# Patient Record
Sex: FEMALE | Race: WHITE | NOT HISPANIC OR LATINO | Employment: OTHER | URBAN - METROPOLITAN AREA
[De-identification: names, ages, dates, MRNs, and addresses within clinical notes are randomized per-mention and may not be internally consistent; named-entity substitution may affect disease eponyms.]

---

## 2019-01-15 ENCOUNTER — AMBULATORY - HEALTHEAST (OUTPATIENT)
Dept: MEDSURG UNIT | Facility: CLINIC | Age: 74
End: 2019-01-15

## 2019-01-21 ENCOUNTER — OFFICE VISIT - HEALTHEAST (OUTPATIENT)
Dept: FAMILY MEDICINE | Facility: CLINIC | Age: 74
End: 2019-01-21

## 2019-01-21 DIAGNOSIS — I48.91 ATRIAL FIBRILLATION WITH RVR (H): ICD-10-CM

## 2019-01-21 DIAGNOSIS — F41.9 ANXIETY: ICD-10-CM

## 2019-01-21 DIAGNOSIS — R09.02 HYPOXIA: ICD-10-CM

## 2019-01-21 ASSESSMENT — MIFFLIN-ST. JEOR: SCORE: 1102.2

## 2019-01-22 ENCOUNTER — COMMUNICATION - HEALTHEAST (OUTPATIENT)
Dept: FAMILY MEDICINE | Facility: CLINIC | Age: 74
End: 2019-01-22

## 2019-02-19 ENCOUNTER — OFFICE VISIT - HEALTHEAST (OUTPATIENT)
Dept: CARDIOLOGY | Facility: CLINIC | Age: 74
End: 2019-02-19

## 2019-02-19 DIAGNOSIS — I48.0 PAROXYSMAL ATRIAL FIBRILLATION (H): ICD-10-CM

## 2019-02-19 ASSESSMENT — MIFFLIN-ST. JEOR: SCORE: 1106.74

## 2019-03-15 ENCOUNTER — COMMUNICATION - HEALTHEAST (OUTPATIENT)
Dept: FAMILY MEDICINE | Facility: CLINIC | Age: 74
End: 2019-03-15

## 2019-03-15 DIAGNOSIS — R09.02 HYPOXIA: ICD-10-CM

## 2019-03-28 ENCOUNTER — COMMUNICATION - HEALTHEAST (OUTPATIENT)
Dept: FAMILY MEDICINE | Facility: CLINIC | Age: 74
End: 2019-03-28

## 2019-04-08 ENCOUNTER — OFFICE VISIT - HEALTHEAST (OUTPATIENT)
Dept: FAMILY MEDICINE | Facility: CLINIC | Age: 74
End: 2019-04-08

## 2019-04-08 ENCOUNTER — COMMUNICATION - HEALTHEAST (OUTPATIENT)
Dept: FAMILY MEDICINE | Facility: CLINIC | Age: 74
End: 2019-04-08

## 2019-04-08 DIAGNOSIS — I48.91 ATRIAL FIBRILLATION WITH RVR (H): ICD-10-CM

## 2019-04-08 LAB
ANION GAP SERPL CALCULATED.3IONS-SCNC: 6 MMOL/L (ref 5–18)
BUN SERPL-MCNC: 11 MG/DL (ref 8–28)
CALCIUM SERPL-MCNC: 9.5 MG/DL (ref 8.5–10.5)
CHLORIDE BLD-SCNC: 105 MMOL/L (ref 98–107)
CO2 SERPL-SCNC: 31 MMOL/L (ref 22–31)
CREAT SERPL-MCNC: 0.81 MG/DL (ref 0.6–1.1)
ERYTHROCYTE [DISTWIDTH] IN BLOOD BY AUTOMATED COUNT: 11.9 % (ref 11–14.5)
GFR SERPL CREATININE-BSD FRML MDRD: >60 ML/MIN/1.73M2
GLUCOSE BLD-MCNC: 107 MG/DL (ref 70–125)
HCT VFR BLD AUTO: 40.5 % (ref 35–47)
HGB BLD-MCNC: 13.4 G/DL (ref 12–16)
MCH RBC QN AUTO: 29 PG (ref 27–34)
MCHC RBC AUTO-ENTMCNC: 33 G/DL (ref 32–36)
MCV RBC AUTO: 88 FL (ref 80–100)
PLATELET # BLD AUTO: 294 THOU/UL (ref 140–440)
PMV BLD AUTO: 8.3 FL (ref 7–10)
POTASSIUM BLD-SCNC: 4.8 MMOL/L (ref 3.5–5)
RBC # BLD AUTO: 4.61 MILL/UL (ref 3.8–5.4)
SODIUM SERPL-SCNC: 142 MMOL/L (ref 136–145)
WBC: 8.4 THOU/UL (ref 4–11)

## 2019-04-11 ENCOUNTER — OFFICE VISIT - HEALTHEAST (OUTPATIENT)
Dept: CARDIOLOGY | Facility: CLINIC | Age: 74
End: 2019-04-11

## 2019-04-11 DIAGNOSIS — I48.0 PAROXYSMAL ATRIAL FIBRILLATION (H): ICD-10-CM

## 2019-04-11 ASSESSMENT — MIFFLIN-ST. JEOR: SCORE: 1115.81

## 2019-06-04 ENCOUNTER — COMMUNICATION - HEALTHEAST (OUTPATIENT)
Dept: FAMILY MEDICINE | Facility: CLINIC | Age: 74
End: 2019-06-04

## 2019-06-04 DIAGNOSIS — R09.02 HYPOXIA: ICD-10-CM

## 2019-06-26 ENCOUNTER — OFFICE VISIT - HEALTHEAST (OUTPATIENT)
Dept: FAMILY MEDICINE | Facility: CLINIC | Age: 74
End: 2019-06-26

## 2019-06-26 ENCOUNTER — COMMUNICATION - HEALTHEAST (OUTPATIENT)
Dept: FAMILY MEDICINE | Facility: CLINIC | Age: 74
End: 2019-06-26

## 2019-06-26 DIAGNOSIS — J18.9 COMMUNITY ACQUIRED PNEUMONIA, UNSPECIFIED LATERALITY: ICD-10-CM

## 2019-06-26 DIAGNOSIS — Z12.11 SCREEN FOR COLON CANCER: ICD-10-CM

## 2019-06-26 DIAGNOSIS — J45.31 MILD PERSISTENT ASTHMA WITH EXACERBATION: ICD-10-CM

## 2019-07-02 ENCOUNTER — OFFICE VISIT - HEALTHEAST (OUTPATIENT)
Dept: FAMILY MEDICINE | Facility: CLINIC | Age: 74
End: 2019-07-02

## 2019-07-02 DIAGNOSIS — J44.1 COPD EXACERBATION (H): ICD-10-CM

## 2019-08-05 ENCOUNTER — COMMUNICATION - HEALTHEAST (OUTPATIENT)
Dept: FAMILY MEDICINE | Facility: CLINIC | Age: 74
End: 2019-08-05

## 2019-08-05 DIAGNOSIS — R09.02 HYPOXIA: ICD-10-CM

## 2019-08-05 DIAGNOSIS — I48.91 ATRIAL FIBRILLATION WITH RVR (H): ICD-10-CM

## 2020-02-28 ENCOUNTER — COMMUNICATION - HEALTHEAST (OUTPATIENT)
Dept: FAMILY MEDICINE | Facility: CLINIC | Age: 75
End: 2020-02-28

## 2020-02-28 DIAGNOSIS — R09.02 HYPOXIA: ICD-10-CM

## 2020-03-02 ENCOUNTER — OFFICE VISIT - HEALTHEAST (OUTPATIENT)
Dept: FAMILY MEDICINE | Facility: CLINIC | Age: 75
End: 2020-03-02

## 2020-03-02 ENCOUNTER — COMMUNICATION - HEALTHEAST (OUTPATIENT)
Dept: FAMILY MEDICINE | Facility: CLINIC | Age: 75
End: 2020-03-02

## 2020-03-02 DIAGNOSIS — R09.02 HYPOXIA: ICD-10-CM

## 2020-03-02 DIAGNOSIS — J44.1 COPD EXACERBATION (H): ICD-10-CM

## 2020-03-02 DIAGNOSIS — I48.91 ATRIAL FIBRILLATION WITH RVR (H): ICD-10-CM

## 2020-04-21 ENCOUNTER — COMMUNICATION - HEALTHEAST (OUTPATIENT)
Dept: FAMILY MEDICINE | Facility: CLINIC | Age: 75
End: 2020-04-21

## 2020-04-21 DIAGNOSIS — R09.02 HYPOXIA: ICD-10-CM

## 2020-06-23 ENCOUNTER — COMMUNICATION - HEALTHEAST (OUTPATIENT)
Dept: CARDIOLOGY | Facility: CLINIC | Age: 75
End: 2020-06-23

## 2020-06-30 ENCOUNTER — COMMUNICATION - HEALTHEAST (OUTPATIENT)
Dept: CARDIOLOGY | Facility: CLINIC | Age: 75
End: 2020-06-30

## 2020-06-30 DIAGNOSIS — I48.0 PAROXYSMAL ATRIAL FIBRILLATION (H): ICD-10-CM

## 2020-07-01 ENCOUNTER — COMMUNICATION - HEALTHEAST (OUTPATIENT)
Dept: CARDIOLOGY | Facility: CLINIC | Age: 75
End: 2020-07-01

## 2020-07-01 DIAGNOSIS — I48.0 PAROXYSMAL ATRIAL FIBRILLATION (H): ICD-10-CM

## 2020-07-14 ENCOUNTER — COMMUNICATION - HEALTHEAST (OUTPATIENT)
Dept: FAMILY MEDICINE | Facility: CLINIC | Age: 75
End: 2020-07-14

## 2020-07-14 DIAGNOSIS — R09.02 HYPOXIA: ICD-10-CM

## 2020-07-15 RX ORDER — BUDESONIDE 0.5 MG/2ML
INHALANT ORAL
Qty: 360 ML | Refills: 0 | Status: SHIPPED | OUTPATIENT
Start: 2020-07-15 | End: 2022-09-20

## 2020-07-29 ENCOUNTER — COMMUNICATION - HEALTHEAST (OUTPATIENT)
Dept: FAMILY MEDICINE | Facility: CLINIC | Age: 75
End: 2020-07-29

## 2020-07-29 DIAGNOSIS — I48.91 ATRIAL FIBRILLATION WITH RVR (H): ICD-10-CM

## 2020-07-29 DIAGNOSIS — R09.02 HYPOXIA: ICD-10-CM

## 2020-07-31 ENCOUNTER — COMMUNICATION - HEALTHEAST (OUTPATIENT)
Dept: CARDIOLOGY | Facility: CLINIC | Age: 75
End: 2020-07-31

## 2020-08-03 ENCOUNTER — OFFICE VISIT - HEALTHEAST (OUTPATIENT)
Dept: CARDIOLOGY | Facility: CLINIC | Age: 75
End: 2020-08-03

## 2020-08-03 DIAGNOSIS — I10 ESSENTIAL HYPERTENSION: ICD-10-CM

## 2020-08-03 DIAGNOSIS — J44.1 COPD EXACERBATION (H): ICD-10-CM

## 2020-08-03 DIAGNOSIS — R09.02 HYPOXIA: ICD-10-CM

## 2020-08-03 DIAGNOSIS — I48.0 PAROXYSMAL ATRIAL FIBRILLATION (H): ICD-10-CM

## 2020-08-03 DIAGNOSIS — I48.91 ATRIAL FIBRILLATION WITH RVR (H): ICD-10-CM

## 2020-08-03 ASSESSMENT — MIFFLIN-ST. JEOR: SCORE: 1133.95

## 2020-08-26 ENCOUNTER — COMMUNICATION - HEALTHEAST (OUTPATIENT)
Dept: FAMILY MEDICINE | Facility: CLINIC | Age: 75
End: 2020-08-26

## 2020-08-26 DIAGNOSIS — R09.02 HYPOXIA: ICD-10-CM

## 2020-08-31 ENCOUNTER — COMMUNICATION - HEALTHEAST (OUTPATIENT)
Dept: CARDIOLOGY | Facility: CLINIC | Age: 75
End: 2020-08-31

## 2020-08-31 DIAGNOSIS — R09.02 HYPOXIA: ICD-10-CM

## 2020-09-01 ENCOUNTER — COMMUNICATION - HEALTHEAST (OUTPATIENT)
Dept: FAMILY MEDICINE | Facility: CLINIC | Age: 75
End: 2020-09-01

## 2020-09-01 DIAGNOSIS — Z00.00 WELLNESS EXAMINATION: ICD-10-CM

## 2020-09-01 RX ORDER — ALBUTEROL SULFATE 90 UG/1
1-2 AEROSOL, METERED RESPIRATORY (INHALATION) EVERY 4 HOURS PRN
Qty: 1 INHALER | Refills: 5 | Status: SHIPPED | OUTPATIENT
Start: 2020-09-01 | End: 2022-01-27

## 2020-11-21 ENCOUNTER — COMMUNICATION - HEALTHEAST (OUTPATIENT)
Dept: CARDIOLOGY | Facility: CLINIC | Age: 75
End: 2020-11-21

## 2020-11-21 DIAGNOSIS — R09.02 HYPOXIA: ICD-10-CM

## 2021-01-04 ENCOUNTER — COMMUNICATION - HEALTHEAST (OUTPATIENT)
Dept: CARDIOLOGY | Facility: CLINIC | Age: 76
End: 2021-01-04

## 2021-01-04 DIAGNOSIS — I48.0 PAROXYSMAL ATRIAL FIBRILLATION (H): ICD-10-CM

## 2021-01-04 RX ORDER — FLECAINIDE ACETATE 100 MG/1
100 TABLET ORAL 2 TIMES DAILY
Qty: 180 TABLET | Refills: 1 | Status: SHIPPED | OUTPATIENT
Start: 2021-01-04 | End: 2022-01-03

## 2021-02-09 ENCOUNTER — COMMUNICATION - HEALTHEAST (OUTPATIENT)
Dept: FAMILY MEDICINE | Facility: CLINIC | Age: 76
End: 2021-02-09

## 2021-04-25 ENCOUNTER — COMMUNICATION - HEALTHEAST (OUTPATIENT)
Dept: FAMILY MEDICINE | Facility: CLINIC | Age: 76
End: 2021-04-25

## 2021-04-25 DIAGNOSIS — R09.02 HYPOXIA: ICD-10-CM

## 2021-04-25 DIAGNOSIS — I48.91 ATRIAL FIBRILLATION WITH RVR (H): ICD-10-CM

## 2021-05-20 ENCOUNTER — RECORDS - HEALTHEAST (OUTPATIENT)
Dept: ADMINISTRATIVE | Facility: OTHER | Age: 76
End: 2021-05-20

## 2021-05-20 DIAGNOSIS — R09.02 HYPOXIA: ICD-10-CM

## 2021-05-20 RX ORDER — RIVAROXABAN 20 MG/1
TABLET, FILM COATED ORAL
Qty: 90 TABLET | Refills: 1 | Status: SHIPPED | OUTPATIENT
Start: 2021-05-20 | End: 2021-11-16

## 2021-05-24 ENCOUNTER — OFFICE VISIT - HEALTHEAST (OUTPATIENT)
Dept: FAMILY MEDICINE | Facility: CLINIC | Age: 76
End: 2021-05-24

## 2021-05-24 DIAGNOSIS — Z00.00 WELLNESS EXAMINATION: ICD-10-CM

## 2021-05-24 DIAGNOSIS — I48.91 ATRIAL FIBRILLATION WITH RVR (H): ICD-10-CM

## 2021-05-24 DIAGNOSIS — I10 ESSENTIAL (PRIMARY) HYPERTENSION: ICD-10-CM

## 2021-05-24 DIAGNOSIS — J45.31 MILD PERSISTENT ASTHMA WITH EXACERBATION: ICD-10-CM

## 2021-05-24 DIAGNOSIS — R09.02 HYPOXIA: ICD-10-CM

## 2021-05-24 LAB
ANION GAP SERPL CALCULATED.3IONS-SCNC: 11 MMOL/L (ref 5–18)
BUN SERPL-MCNC: 14 MG/DL (ref 8–28)
CALCIUM SERPL-MCNC: 8.4 MG/DL (ref 8.5–10.5)
CHLORIDE BLD-SCNC: 107 MMOL/L (ref 98–107)
CHOLEST SERPL-MCNC: 221 MG/DL
CO2 SERPL-SCNC: 27 MMOL/L (ref 22–31)
CREAT SERPL-MCNC: 0.78 MG/DL (ref 0.6–1.1)
ERYTHROCYTE [DISTWIDTH] IN BLOOD BY AUTOMATED COUNT: 12.3 % (ref 11–14.5)
FASTING STATUS PATIENT QL REPORTED: YES
GFR SERPL CREATININE-BSD FRML MDRD: >60 ML/MIN/1.73M2
GLUCOSE BLD-MCNC: 108 MG/DL (ref 70–125)
HCT VFR BLD AUTO: 42 % (ref 35–47)
HDLC SERPL-MCNC: 60 MG/DL
HGB BLD-MCNC: 13.4 G/DL (ref 12–16)
LDLC SERPL CALC-MCNC: 140 MG/DL
MCH RBC QN AUTO: 28.4 PG (ref 27–34)
MCHC RBC AUTO-ENTMCNC: 31.9 G/DL (ref 32–36)
MCV RBC AUTO: 89 FL (ref 80–100)
PLATELET # BLD AUTO: 323 THOU/UL (ref 140–440)
PMV BLD AUTO: 10.2 FL (ref 7–10)
POTASSIUM BLD-SCNC: 3.6 MMOL/L (ref 3.5–5)
RBC # BLD AUTO: 4.72 MILL/UL (ref 3.8–5.4)
SODIUM SERPL-SCNC: 145 MMOL/L (ref 136–145)
TRIGL SERPL-MCNC: 103 MG/DL
WBC: 9.3 THOU/UL (ref 4–11)

## 2021-05-24 RX ORDER — ASCORBIC ACID 500 MG
500 TABLET ORAL DAILY
Status: SHIPPED | COMMUNITY
Start: 2021-05-24

## 2021-05-24 ASSESSMENT — PATIENT HEALTH QUESTIONNAIRE - PHQ9: SUM OF ALL RESPONSES TO PHQ QUESTIONS 1-9: 7

## 2021-05-24 ASSESSMENT — MIFFLIN-ST. JEOR: SCORE: 1133.95

## 2021-05-25 ENCOUNTER — COMMUNICATION - HEALTHEAST (OUTPATIENT)
Dept: FAMILY MEDICINE | Facility: CLINIC | Age: 76
End: 2021-05-25

## 2021-05-27 NOTE — TELEPHONE ENCOUNTER
Medication Question or Clarification  Who is calling: Patient  What medication are you calling about?   albuterol (ACCUNEB) 0.63 mg/3 mL nebulizer solution  1 1/15/2019     Sig: INHALE 1 VIAL VIA NEBULIZER EVERY 4 HOURS AS NEEDED FOR WHEEZING    Who prescribed the medication?: Alessio Fischer MD   What is your question/concern?: Patient stated that she would like to know if she is to continue this medication  Pharmacy: [facility] in [facility] to leave a detailed message?: No (work) [phone] or (home) [phone]. Patient stated that she is at work from 11 am to 7 pm today.   Site [facility] - Please call the pharmacy to obtain any additional needed information.        Medication Question or Clarification  Who is calling: Patient  What medication are you calling about?   budesonide (PULMICORT) 0.5 mg/2 mL nebulizer solution 360 mL 0 3/17/2019 6/15/2019    Sig - Route: Take 2 mL (0.5 mg total) by nebulization 2 (two) times a day. - Nebulization    Who prescribed the medication?: Alessio Fischer MD   What is your question/concern?: Patient stated that she would like to know if she is to continue this medication  Pharmacy: [facility] in [facility] to leave a detailed message?: No (work) [phone] or (home) [phone]. Patient stated that she is at work from 11 am to 7 pm today.   Site [facility] - Please call the pharmacy to obtain any additional needed information.

## 2021-05-27 NOTE — TELEPHONE ENCOUNTER
Patient Returning Call  Reason for call:  Patent calling   Information relayed to patient:  Below message relayed to patient   Patient has additional questions:  NO  If YES, what are your questions/concerns:  NO  Okay to leave a detailed message?:  No call back needed     I'd recommend being seen to see if we can get her off them.  Please have her come in to visit.  She may not need now that her afib is converted.

## 2021-05-27 NOTE — TELEPHONE ENCOUNTER
FYI - Status Update  Who is Calling: Patient  Update: Patient is scheduled for office visit 04/01/19 with Dr. Sandoval  Okay to leave a detailed message?:  No return call needed

## 2021-05-27 NOTE — TELEPHONE ENCOUNTER
No answer.     Left message to call back for: patient  Information to relay to patient:  See below

## 2021-05-27 NOTE — TELEPHONE ENCOUNTER
I'd recommend being seen to see if we can get her off them.  Please have her come in to visit.  She may not need now that her afib is converted.

## 2021-05-27 NOTE — PROGRESS NOTES
Chief Complaint   Patient presents with     Asthma     Follow up        HPI: 74-year-old female presents today for evaluation of her chronic medical conditions including her asthma which has been stable, her COPD has been stable, and she continues to take apixaban for atrial fibrillation.  She notes that her heart has had episodes of running fast.      ROS:She has had no syncope or chest pain.  No edema.    SH:    reports that she quit smoking about 40 years ago. She has never used smokeless tobacco. She reports that she does not drink alcohol or use drugs.      FH: The Patient's family history is not on file.     Meds:  Samra has a current medication list which includes the following prescription(s): albuterol, albuterol, budesonide, cholecalciferol (vitamin d3), guaifenesin er, rivaroxaban, ubidecarenone, albuterol, citalopram, diltiazem, and hydroxyzine pamoate.    O:  /66   Pulse 90   Wt 151 lb (68.5 kg)   SpO2 97%   BMI 28.30 kg/m     Constitutional:    --Vitals as above  --No acute distress  Eyes-  --Sclera noninjected  --Lids and conjunctiva normal  ENT-  --TMs clear  --Sclera noninjected  --Pharynx not erythematous  Neck-  --Neck supple    Lymph-  --No cervical lymphadenopathy  Lungs-  --Clear to Auscultation  Heart-  --Regular rate and rhythm  Skin-  --Pink and dry          A/P:   1. Atrial fibrillation with RVR (H)  The patient has had episodes of tachycardia which may represent breakthrough atrial fibrillation.  We will have her see cardiology soon.  - Ambulatory referral to Cardiology  - Basic Metabolic Panel  - HM2(CBC w/o Differential)    2.  Asthma  - Continue Pulmicort  -Continue albuterol as needed  -Follow-up if not improving    3.  COPD  -Stable    RTC 6 months
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
## 2021-05-28 ASSESSMENT — ASTHMA QUESTIONNAIRES: ACT_TOTALSCORE: 20

## 2021-05-29 NOTE — TELEPHONE ENCOUNTER
Refill Approved    Rx renewed per Medication Renewal Policy. Medication was last renewed on 3/17/19.    Rachel Saavedra, [facility] Triage/Med Refill 6/5/2019     Requested Prescriptions   Pending Prescriptions Disp Refills     budesonide (PULMICORT) 0.5 mg/2 mL nebulizer solution [Pharmacy Med Name: BUDESONIDE 0.5 MG/2 ML SUSP] 360 mL 0     Sig: TAKE 2 ML (0.5 MG TOTAL) BY NEBULIZATION 2 (TWO) TIMES A DAY.       Asthma Medications Refill Protocol Passed - 6/4/2019 10:39 AM        Passed - PCP or prescribing provider visit in last year     Last office visit with prescriber/PCP: 4/8/2019 Johana Sandoval MD OR same dept: 4/8/2019 Johana Sandoval MD OR same specialty: 4/8/2019 Johana Sandoval MD  Last physical: Visit date not found Last MTM visit: Visit date not found    Next appt within 3 mo: Visit date not found Next physical within 3 mo: Visit date not found  Prescriber OR PCP: Johana Sandoval MD  Last diagnosis associated with med order: 1. Hypoxia  - budesonide (PULMICORT) 0.5 mg/2 mL nebulizer solution [Pharmacy Med Name: BUDESONIDE 0.5 MG/2 ML SUSP]; Take 2 mL (0.5 mg total) by nebulization 2 (two) times a day.  Dispense: 360 mL; Refill: 0    If protocol passes may refill for 6 months if within 3 months of last provider visit (or a total of 9 months).

## 2021-05-30 ENCOUNTER — COMMUNICATION - HEALTHEAST (OUTPATIENT)
Dept: FAMILY MEDICINE | Facility: CLINIC | Age: 76
End: 2021-05-30

## 2021-05-30 DIAGNOSIS — R09.02 HYPOXIA: ICD-10-CM

## 2021-05-30 DIAGNOSIS — I48.91 ATRIAL FIBRILLATION WITH RVR (H): ICD-10-CM

## 2021-05-30 NOTE — PROGRESS NOTES
Chief Complaint   Patient presents with     Fever     cough and fever a little over 100       HPI: Very pleasant 74-year-old female who recently returned from a bus trip to [place], presents today with cough, phlegm production, mild fever for 5 days duration of moderate intensity.  This is in the context of being around many other people and is confined space.    ROS: No shortness of breath.  No vomiting or diarrhea or rashes    SH:    reports that she quit smoking about 40 years ago. She has never used smokeless tobacco. She reports that she does not drink alcohol or use drugs.      FH: The Patient's family history is not on file.     Meds:  Samra has a current medication list which includes the following prescription(s): albuterol, budesonide, cholecalciferol (vitamin d3), citalopram, diltiazem, flecainide, rivaroxaban, ubidecarenone, albuterol, albuterol, diltiazem, and levofloxacin.    O:  /60   Pulse 83   Temp 98.5  F (36.9  C)   Wt 149 lb 14.4 oz (68 kg)   SpO2 94%   BMI 28.09 kg/m     Constitutional:    --Vitals as above  --No acute distress but coughing  Eyes-  --Sclera noninjected  --Lids and conjunctiva normal  ENT-  --TMs clear  --Sclera noninjected  --Pharynx not erythematous  Neck-  --Neck supple    Lymph-  --No cervical lymphadenopathy  Lungs-  --wheezes bilaterally  Heart-  --Regular rate and rhythm  Skin-  --Pink and dry          A/P:   1. Community acquired pneumonia, unspecified laterality  Patient has COPD and is therefore at higher risk.  Will treat with Levaquin, as well as prednisone a tapering dose. Patient currently has prednisone in her possession so we will start a tapering dose.  - levoFLOXacin (LEVAQUIN) 750 MG tablet; Take 1 tablet (750 mg total) by mouth daily for 10 days.  Dispense: 10 tablet; Refill: 0      2.  COPD  -I think the prednisone will help the COPD as well.  Patient will follow-up in 1 week for recheck.

## 2021-05-30 NOTE — PROGRESS NOTES
CC: COPD follow-up      HPI: Patient presents today for follow-up.  Patient was seen last week and diagnosed with committee acquired pneumonia.  Eventually given Zithromax and prednisone.  She notes remarkable improvement.  Her breathing is better, she has no phlegm and no fever.    ROS: No sputum no fever no dyspnea no chest pain    SH:    reports that she quit smoking about 40 years ago. She has never used smokeless tobacco. She reports that she does not drink alcohol or use drugs.      FH: The Patient's family history is not on file.     Meds:  Samra has a current medication list which includes the following prescription(s): albuterol, albuterol, azithromycin, budesonide, cholecalciferol (vitamin d3), citalopram, diltiazem, flecainide, levofloxacin, rivaroxaban, ubidecarenone, albuterol, diltiazem, and methylprednisolone.    O:  /80   Pulse 65   Temp 98.3  F (36.8  C)   Resp 16   Wt 150 lb 6 oz (68.2 kg)   SpO2 94%   BMI 28.18 kg/m    Alert no acute distress  Lungs--Clear to Auscultation but mildly diminished consistent with COPD  Heart--Regular rate and rhythm  Abdomen--Soft, non-tender, non-distended  Skin-Pink and dry     A/P:   1. COPD exacerbation (H)  Patient has COPD and had an exacerbation which is improved.  Because of the timeliness of need for steroids I have given her a home package of prednisone to be used at the first initiation of symptoms.  In addition, she will come to the office for evaluation as soon as symptoms occur.  - methylPREDNISolone (MEDROL DOSEPACK) 4 mg tablet; Take 1 tablet (4 mg total) by mouth daily for 6 days. Follow package directions  Dispense: 21 tablet; Refill: 0

## 2021-05-30 NOTE — TELEPHONE ENCOUNTER
Pharmacist notified. Same interaction noted with zpack although it's less likely.     Fill zpack or send another med?

## 2021-05-30 NOTE — TELEPHONE ENCOUNTER
Medication Question or Clarification  Who is calling: Pharmacy: [facility]  What medication are you calling about? (include dose and sig)   levoFLOXacin (LEVAQUIN) 750 MG tablet 10 tablet 0 6/26/2019 7/6/2019    Sig - Route: Take 1 tablet (750 mg total) by mouth daily for 10 days. - Oral    Sent to pharmacy as: levoFLOXacin (LEVAQUIN) 750 MG tablet    E-Prescribing Status: Receipt confirmed by pharmacy (6/26/2019  9:13 AM CDT)        Who prescribed the medication?: Johana Sandoval MD  What is your question/concern?: Pharmacy states there is an interaction with the above medication, and Flecainide, causing irregular heartbeat. Please reach out to the pharmacy and advise.  Pharmacy: [facility] to leave a detailed message?: No  Site CMT - Please call the pharmacy to obtain any additional needed information.

## 2021-05-31 NOTE — TELEPHONE ENCOUNTER
RN cannot approve Refill Request    RN can NOT refill Xarelot because the Protocol failed and NO refill given. Last office visit: 7/2/2019 Johana Sandoval MD Last Physical: Visit date not found Last MTM visit: Visit date not found Last visit same specialty: 7/2/2019 Johana Sandoval MD.  Next visit within 3 mo: Visit date not found  Next physical within 3 mo: Visit date not found    Diltiazem Rx renewed per Medication Renewal Policy.    Patsy Martinez, [facility] Triage/Med Refill 8/6/2019    Requested Prescriptions   Pending Prescriptions Disp Refills     diltiazem (CARDIZEM CD) 180 MG 24 hr capsule [Pharmacy Med Name: DILTIAZEM 24H ER(CD) 180 MG CP] 90 capsule 1     Sig: TAKE 1 CAPSULE BY MOUTH EVERY DAY       Calcium-Channel Blockers Protocol Passed - 8/5/2019  7:49 PM        Passed - PCP or prescribing provider visit in past 12 months or next 3 months     Last office visit with prescriber/PCP: 7/2/2019 Johana Sandoval MD OR same dept: 7/2/2019 Johana Sandoval MD OR same specialty: 7/2/2019 Johana Sandoval MD  Last physical: Visit date not found Last MTM visit: Visit date not found   Next visit within 3 mo: Visit date not found  Next physical within 3 mo: Visit date not found  Prescriber OR PCP: Johana Sandoval MD  Last diagnosis associated with med order: 1. Hypoxia  - diltiazem (CARDIZEM CD) 180 MG 24 hr capsule [Pharmacy Med Name: DILTIAZEM 24H ER(CD) 180 MG CP]; TAKE 1 CAPSULE BY MOUTH EVERY DAY  Dispense: 90 capsule; Refill: 1  - XARELTO 20 mg tablet [Pharmacy Med Name: XARELTO 20 MG TABLET]; TAKE 1 TABLET (20 MG TOTAL) BY MOUTH DAILY WITH SUPPER.  Dispense: 90 tablet; Refill: 1    2. Atrial fibrillation with RVR (H)  - diltiazem (CARDIZEM CD) 180 MG 24 hr capsule [Pharmacy Med Name: DILTIAZEM 24H ER(CD) 180 MG CP]; TAKE 1 CAPSULE BY MOUTH EVERY DAY  Dispense: 90 capsule; Refill: 1    If protocol passes may refill for 12 months if within 3 months of last provider  visit (or a total of 15 months).             Passed - Blood pressure filed in past 12 months     BP Readings from Last 1 Encounters:   07/02/19 128/80             XARELTO 20 mg tablet [Pharmacy Med Name: XARELTO 20 MG TABLET] 90 tablet 1     Sig: TAKE 1 TABLET (20 MG TOTAL) BY MOUTH DAILY WITH SUPPER.       Apixaban/Rivaroxaban/Dabigatran Refill Protocol Failed - 8/5/2019  7:49 PM        Failed - Route to appropriate pool/provider     Last Anticoagulation Summary:           Passed - Renal function test in last year     Creatinine   Date Value Ref Range Status   04/08/2019 0.81 0.60 - 1.10 mg/dL Final             Passed - PCP or prescribing provider visit in past 12 months       Last office visit with prescriber/PCP: 7/2/2019 Johana Sandoval MD OR same dept: 7/2/2019 Johana Sandoval MD OR same specialty: 7/2/2019 Johana Sandoval MD  Last physical: Visit date not found Last MTM visit: Visit date not found   Next visit within 3 mo: Visit date not found  Next physical within 3 mo: Visit date not found  Prescriber OR PCP: Johana Sandoval MD  Last diagnosis associated with med order: 1. Hypoxia  - diltiazem (CARDIZEM CD) 180 MG 24 hr capsule [Pharmacy Med Name: DILTIAZEM 24H ER(CD) 180 MG CP]; TAKE 1 CAPSULE BY MOUTH EVERY DAY  Dispense: 90 capsule; Refill: 1  - XARELTO 20 mg tablet [Pharmacy Med Name: XARELTO 20 MG TABLET]; TAKE 1 TABLET (20 MG TOTAL) BY MOUTH DAILY WITH SUPPER.  Dispense: 90 tablet; Refill: 1    2. Atrial fibrillation with RVR (H)  - diltiazem (CARDIZEM CD) 180 MG 24 hr capsule [Pharmacy Med Name: DILTIAZEM 24H ER(CD) 180 MG CP]; TAKE 1 CAPSULE BY MOUTH EVERY DAY  Dispense: 90 capsule; Refill: 1    If protocol passes may refill for 12 months if within 3 months of last provider visit (or a total of 15 months).

## 2021-06-01 ENCOUNTER — COMMUNICATION - HEALTHEAST (OUTPATIENT)
Dept: SCHEDULING | Facility: CLINIC | Age: 76
End: 2021-06-01

## 2021-06-01 DIAGNOSIS — R09.02 HYPOXIA: ICD-10-CM

## 2021-06-01 DIAGNOSIS — I48.91 ATRIAL FIBRILLATION WITH RVR (H): ICD-10-CM

## 2021-06-01 RX ORDER — DILTIAZEM HYDROCHLORIDE 180 MG/1
180 CAPSULE, COATED, EXTENDED RELEASE ORAL DAILY
Qty: 90 CAPSULE | Refills: 3 | Status: SHIPPED | OUTPATIENT
Start: 2021-06-01 | End: 2022-06-09

## 2021-06-02 VITALS — WEIGHT: 152 LBS | BODY MASS INDEX: 28.7 KG/M2 | HEIGHT: 61 IN

## 2021-06-02 VITALS — WEIGHT: 150 LBS | HEIGHT: 61 IN | BODY MASS INDEX: 28.32 KG/M2

## 2021-06-02 VITALS — HEIGHT: 61 IN | BODY MASS INDEX: 28.13 KG/M2 | WEIGHT: 149 LBS

## 2021-06-02 VITALS — WEIGHT: 151 LBS | BODY MASS INDEX: 28.3 KG/M2

## 2021-06-03 VITALS — WEIGHT: 150.38 LBS | BODY MASS INDEX: 28.18 KG/M2

## 2021-06-03 VITALS — WEIGHT: 149.9 LBS | BODY MASS INDEX: 28.09 KG/M2

## 2021-06-04 VITALS
WEIGHT: 156 LBS | DIASTOLIC BLOOD PRESSURE: 88 MMHG | HEART RATE: 90 BPM | RESPIRATION RATE: 18 BRPM | BODY MASS INDEX: 29.45 KG/M2 | HEIGHT: 61 IN | SYSTOLIC BLOOD PRESSURE: 150 MMHG

## 2021-06-04 VITALS
SYSTOLIC BLOOD PRESSURE: 138 MMHG | RESPIRATION RATE: 16 BRPM | DIASTOLIC BLOOD PRESSURE: 64 MMHG | HEART RATE: 80 BPM | WEIGHT: 154.44 LBS | BODY MASS INDEX: 28.94 KG/M2 | OXYGEN SATURATION: 94 %

## 2021-06-06 NOTE — TELEPHONE ENCOUNTER
RN cannot approve Refill Request    RN can NOT refill this medication med is not covered by policy/route to provider. Last office visit: 7/2/2019 Johana Sandoval MD Last Physical: Visit date not found Last MTM visit: Visit date not found Last visit same specialty: 7/2/2019 Johana Sandoval MD.  Next visit within 3 mo: Visit date not found  Next physical within 3 mo: Visit date not found      Dunia Gtz, [facility] Triage/Med Refill 2/28/2020    Requested Prescriptions   Pending Prescriptions Disp Refills     XARELTO 20 mg tablet [Pharmacy Med Name: XARELTO 20 MG TABLET] 90 tablet 1     Sig: TAKE 1 TABLET (20 MG TOTAL) BY MOUTH DAILY WITH SUPPER.       Apixaban/Rivaroxaban/Dabigatran Refill Protocol Failed - 2/28/2020  1:48 AM        Failed - Route to appropriate pool/provider     Last Anticoagulation Summary:           Passed - Renal function test in last year     Creatinine   Date Value Ref Range Status   04/08/2019 0.81 0.60 - 1.10 mg/dL Final             Passed - PCP or prescribing provider visit in past 12 months       Last office visit with prescriber/PCP: 7/2/2019 Johana Sandoval MD OR same dept: 7/2/2019 Johana Sandoval MD OR same specialty: 7/2/2019 Johana Sandoval MD  Last physical: Visit date not found Last MTM visit: Visit date not found   Next visit within 3 mo: Visit date not found  Next physical within 3 mo: Visit date not found  Prescriber OR PCP: Johana Sandoval MD  Last diagnosis associated with med order: 1. Hypoxia  - XARELTO 20 mg tablet [Pharmacy Med Name: XARELTO 20 MG TABLET]; TAKE 1 TABLET (20 MG TOTAL) BY MOUTH DAILY WITH SUPPER.  Dispense: 90 tablet; Refill: 1    If protocol passes may refill for 12 months if within 3 months of last provider visit (or a total of 15 months).

## 2021-06-06 NOTE — PROGRESS NOTES
Chief Complaint   Patient presents with     Medication Management       HPI: Patient presents today for recheck of her atrial fibrillation.  Her heart rate is remained stable she has had no chest pain and she feels well.  She continues to take Xarelto and she has had no bleeding difficulties.  She continues on flecainide without side effects.  She is scheduled to see cardiology next month.    Her breathing is doing well and she continues to take albuterol on a as needed basis as well as Pulmicort.    ROS: No chest pain or shortness of breath or edema.  No difficulty breathing.    SH: Reviewed-see Snapshot for review.     FH: Reviewed-see Snapshot for review.    Meds:  Samra has a current medication list which includes the following prescription(s): albuterol, albuterol, albuterol, azithromycin, budesonide, cholecalciferol (vitamin d3), diltiazem, flecainide, rivaroxaban anticoagulant, and ubidecarenone.    O:  /64   Pulse 80   Resp 16   Wt 154 lb 7 oz (70.1 kg)   SpO2 94%   BMI 28.94 kg/m    Constitutional:    --Vitals as above  --No acute distress  Eyes-  --Sclera noninjected  --Lids and conjunctiva normal  Neck-  --Neck supple    Lymph-  --No cervical lymphadenopathy  Lungs-  --Clear to Auscultation  Heart-  --Regular rate and rhythm with no evidence of A. fib  Skin-  --Pink and dry    A/P:   1. Atrial fibrillation with RVR (H)  Stable.  Continue flecainide.  Continue Xarelto.    2. COPD exacerbation (H)  Continue Pulmicort.  Albuterol as needed.    RTC 6 months

## 2021-06-07 NOTE — TELEPHONE ENCOUNTER
RN cannot approve Refill Request    RN can NOT refill this medication med is not covered by policy/route to provider     . Last office visit: 3/2/2020 Johana Sandoval MD Last Physical: Visit date not found Last MTM visit: Visit date not found Last visit same specialty: 3/2/2020 Johana Sandoval MD.  Next visit within 3 mo: Visit date not found  Next physical within 3 mo: Visit date not found      Rachel Saavedra, [facility] [place] Connection Triage/Med Refill 4/22/2020    Requested Prescriptions   Pending Prescriptions Disp Refills     budesonide (PULMICORT) 0.5 mg/2 mL nebulizer solution [Pharmacy Med Name: BUDESONIDE 0.5 MG/2 ML SUSP] 360 mL 1     Sig: INHALE 2 ML (0.5 MG TOTAL) BY NEBULIZATION 2 (TWO) TIMES A DAY.       There is no refill protocol information for this order

## 2021-06-09 NOTE — TELEPHONE ENCOUNTER
Last Med Check: 3/2/20.    Next med check due on: 3/2021.    Future Appointment Scheduled ? No.     Last Med Refill? 4/22/20.    Binta Clarke, [facility]

## 2021-06-09 NOTE — TELEPHONE ENCOUNTER
Wellness Screening Tool  Symptom Screening:  Do you have one of the following NEW symptoms:    Fever (subjective or >100.0)?  No    A new cough?  No    Shortness of breath?  No     Chills? No     New loss of taste or smell? No     Generalized body aches? No     New persistent headache? No     New sore throat? No     Nausea, vomiting, or diarrhea?  No    Within the past 3 weeks, have you been exposed to someone with a known positive illness below:    COVID-19 (known or suspected)?  No    Chicken pox?  No    Mealses?  No    Pertussis?  No    Patient notified of visitor restrictions: Yes  Pt informed to wear a mask: Yes  Pt notified if they develop any symptoms listed above, prior to their appointment, they are to call the clinic directly at [phone] for further instructions.  Yes  Patient's appointment status: Patient will be seen in clinic as scheduled on Wed. 6-24-20 @ 0910 in [facility] with Dr. KENNY Castaneda.  mg

## 2021-06-10 NOTE — TELEPHONE ENCOUNTER
Wellness Screening Tool  Symptom Screening:  Do you have one of the following NEW symptoms:    Fever (subjective or >100.0)?  No    A new cough?  No    Shortness of breath?  No     Chills? No     New loss of taste or smell? No     Generalized body aches? No     New persistent headache? No     New sore throat? No     Nausea, vomiting, or diarrhea?  No    Within the past 3 weeks, have you been exposed to someone with a known positive illness below:    COVID-19 (known or suspected)?  No    Chicken pox?  No    Mealses?  No    Pertussis?  No    Patient notified of visitor policy- They may have one person accompany them to their appointment, but they will need to wear a mask and will be screened upon arrival for symptoms: Yes  Pt informed to wear a mask: Yes  Pt notified if they develop any symptoms listed above, prior to their appointment, they are to call the clinic directly at [phone] for further instructions.  Yes  Patient's appointment status: Patient will be seen in clinic as scheduled on Aug. 3

## 2021-06-10 NOTE — TELEPHONE ENCOUNTER
RN cannot approve Refill Request    RN can NOT refill this medication Protocol failed and NO refill given. Last office visit: 3/2/2020 Johana Sandoval MD Last Physical: Visit date not found Last MTM visit: Visit date not found Last visit same specialty: 3/2/2020 Johana Sandoval MD.  Next visit within 3 mo: Visit date not found  Next physical within 3 mo: Visit date not found      Rachel Saavedra, [facility] Triage/Med Refill 8/28/2020    Requested Prescriptions   Pending Prescriptions Disp Refills     XARELTO 20 mg tablet [Pharmacy Med Name: XARELTO 20 MG TABLET] 90 tablet 1     Sig: TAKE 1 TABLET (20 MG TOTAL) BY MOUTH DAILY WITH SUPPER.       Apixaban/Rivaroxaban/Dabigatran Refill Protocol Failed - 8/26/2020 12:25 AM        Failed - Renal function test in last year     Creatinine   Date Value Ref Range Status   04/08/2019 0.81 0.60 - 1.10 mg/dL Final             Failed - Route to appropriate pool/provider     Last Anticoagulation Summary:           Passed - PCP or prescribing provider visit in past 12 months       Last office visit with prescriber/PCP: 3/2/2020 Johana Sandoval MD OR same dept: 3/2/2020 Johana Sandoval MD OR same specialty: 3/2/2020 Johana Sandoval MD  Last physical: Visit date not found Last MTM visit: Visit date not found   Next visit within 3 mo: Visit date not found  Next physical within 3 mo: Visit date not found  Prescriber OR PCP: Johana Sandoval MD  Last diagnosis associated with med order: 1. Hypoxia  - XARELTO 20 mg tablet [Pharmacy Med Name: XARELTO 20 MG TABLET]; TAKE 1 TABLET (20 MG TOTAL) BY MOUTH DAILY WITH SUPPER.  Dispense: 90 tablet; Refill: 1    If protocol passes may refill for 12 months if within 3 months of last provider visit (or a total of 15 months).

## 2021-06-10 NOTE — TELEPHONE ENCOUNTER
Refill Approved    Rx renewed per Medication Renewal Policy. Medication was last renewed on 8/6/19.    Rachel Saavedra, [facility] Triage/Med Refill 7/31/2020     Requested Prescriptions   Pending Prescriptions Disp Refills     diltiazem (CARDIZEM CD) 180 MG 24 hr capsule 90 capsule 3     Sig: Take 1 capsule (180 mg total) by mouth daily.       Calcium-Channel Blockers Protocol Passed - 7/29/2020  1:47 PM        Passed - PCP or prescribing provider visit in past 12 months or next 3 months     Last office visit with prescriber/PCP: 3/2/2020 Johana Sandoval MD OR same dept: 3/2/2020 Johana Sandoval MD OR same specialty: 3/2/2020 Johana Sandoval MD  Last physical: Visit date not found Last MTM visit: Visit date not found   Next visit within 3 mo: Visit date not found  Next physical within 3 mo: Visit date not found  Prescriber OR PCP: Johana Sandoval MD  Last diagnosis associated with med order: 1. Hypoxia  - diltiazem (CARDIZEM CD) 180 MG 24 hr capsule; Take 1 capsule (180 mg total) by mouth daily.  Dispense: 90 capsule; Refill: 3    2. Atrial fibrillation with RVR (H)  - diltiazem (CARDIZEM CD) 180 MG 24 hr capsule; Take 1 capsule (180 mg total) by mouth daily.  Dispense: 90 capsule; Refill: 3    If protocol passes may refill for 12 months if within 3 months of last provider visit (or a total of 15 months).             Passed - Blood pressure filed in past 12 months     BP Readings from Last 1 Encounters:   03/02/20 138/64

## 2021-06-11 NOTE — TELEPHONE ENCOUNTER
Left message to call back for: pt  Information to relay to patient:  Please assist in scheduling pt for an in office med check.

## 2021-06-11 NOTE — TELEPHONE ENCOUNTER
----- Message from Marquise Castaneda MD sent at 8/31/2020 11:35 AM CDT -----  Regarding: RE: BMP for Xarelto  I'm not particularly concerned about it. thanks  ELGIN  ----- Message -----  From: Rachel Adams RN  Sent: 8/31/2020  10:58 AM CDT  To: Marquise Castaneda MD  Subject: BMP for Xarelto                                  Pt is on Xarelto last seen by you 8/3/20. Last BMP I find is 4/8/19. Concerns? Order BMP?

## 2021-06-11 NOTE — TELEPHONE ENCOUNTER
"Patient Returning Call  Reason for call:  Medication  Information relayed to patient: See message below  Patient has additional questions:  No  If YES, what are your questions/concerns:  NA   Okay to leave a detailed message?: No call back needed. The patient states this has been \" taken care of from my cardiologist.\"      "

## 2021-06-11 NOTE — TELEPHONE ENCOUNTER
Refill Request  Did you contact pharmacy: Yes  Medication name:   Requested Prescriptions     Pending Prescriptions Disp Refills     albuterol (PROAIR HFA;PROVENTIL HFA;VENTOLIN HFA) 90 mcg/actuation inhaler   0     Sig: Inhale 1-2 puffs every 4 (four) hours as needed for wheezing.     Who prescribed the medication: Na- historical medication  Requested Pharmacy: [facility]  Is patient out of medication: NA  Patient notified refills processed in 3 business days:  DONALD  Okay to leave a detailed message: no

## 2021-06-11 NOTE — TELEPHONE ENCOUNTER
RN cannot approve Refill Request    RN can NOT refill this medication historical medication requested. Last office visit: 3/2/2020 Johana Sandoval MD Last Physical: Visit date not found Last MTM visit: Visit date not found Last visit same specialty: 3/2/2020 Johana Sandoval MD.  Next visit within 3 mo: Visit date not found  Next physical within 3 mo: Visit date not found      Rachel Saavedra, [facility] Connection Triage/Med Refill 9/1/2020    Requested Prescriptions   Pending Prescriptions Disp Refills     albuterol (PROAIR HFA;PROVENTIL HFA;VENTOLIN HFA) 90 mcg/actuation inhaler  0     Sig: Inhale 1-2 puffs every 4 (four) hours as needed for wheezing.       Albuterol/Levalbuterol Refill Protocol Passed - 9/1/2020 12:05 PM        Passed - PCP or prescribing provider visit in last year     Last office visit with prescriber/PCP: 3/2/2020 Johana Sandoval MD OR same dept: 3/2/2020 Johana Sandoval MD OR same specialty: 3/2/2020 Johana Sandoval MD Last physical: Visit date not found       Next appt within 3 mo: Visit date not found  Next physical within 3 mo: Visit date not found  Prescriber OR PCP: Johana Sandoval MD  Last diagnosis associated with med order: There are no diagnoses linked to this encounter.  If protocol passes may refill for 6 months if within 3 months of last provider visit (or a total of 9 months). If patient requesting >1 inhaler per month refill x 6 months and have patient make appointment with provider.

## 2021-06-15 NOTE — TELEPHONE ENCOUNTER
Who is calling:  Samra Davis  Reason for Call:  Pt would like to discuss her concerns about any possible complications to getting the COVID Vaccine as related to her health conditions prior to attempting to schedule the vaccine.  Da2te of last appointment with primary care:  3/2/2020  Has the patient been recently seen:  No  Okay to leave a detailed message: Yes

## 2021-06-16 PROBLEM — J45.901 ASTHMA EXACERBATION: Status: ACTIVE | Noted: 2019-01-14

## 2021-06-16 PROBLEM — I48.91 ATRIAL FIBRILLATION WITH RVR (H): Status: ACTIVE | Noted: 2019-01-13

## 2021-06-16 PROBLEM — J44.1 COPD EXACERBATION (H): Status: ACTIVE | Noted: 2019-01-15

## 2021-06-16 NOTE — TELEPHONE ENCOUNTER
RN cannot approve Refill Request    RN can NOT refill this medication PCP messaged that patient is overdue for Office Visit. Last office visit: 3/2/2020 Johana Sandoval MD Last Physical: Visit date not found Last MTM visit: Visit date not found Last visit same specialty: 3/2/2020 Johana Sandoval MD.  Next visit within 3 mo: Visit date not found  Next physical within 3 mo: Visit date not found      Mary Buchanan, [facility] Triage/Med Refill 4/25/2021    Requested Prescriptions   Pending Prescriptions Disp Refills     diltiazem (CARDIZEM CD) 180 MG 24 hr capsule [Pharmacy Med Name: DILTIAZEM 24H ER(CD) 180 MG CP] 90 capsule 2     Sig: TAKE 1 CAPSULE BY MOUTH EVERY DAY       Calcium-Channel Blockers Protocol Failed - 4/25/2021 12:29 AM        Failed - PCP or prescribing provider visit in past 12 months or next 3 months     Last office visit with prescriber/PCP: 3/2/2020 Johana Sandoval MD OR same dept: Visit date not found OR same specialty: 3/2/2020 Johana Sandoval MD  Last physical: Visit date not found Last MTM visit: Visit date not found   Next visit within 3 mo: Visit date not found  Next physical within 3 mo: Visit date not found  Prescriber OR PCP: Johana Sandoval MD  Last diagnosis associated with med order: 1. Hypoxia  - diltiazem (CARDIZEM CD) 180 MG 24 hr capsule [Pharmacy Med Name: DILTIAZEM 24H ER(CD) 180 MG CP]; TAKE 1 CAPSULE BY MOUTH EVERY DAY  Dispense: 90 capsule; Refill: 2    2. Atrial fibrillation with RVR (H)  - diltiazem (CARDIZEM CD) 180 MG 24 hr capsule [Pharmacy Med Name: DILTIAZEM 24H ER(CD) 180 MG CP]; TAKE 1 CAPSULE BY MOUTH EVERY DAY  Dispense: 90 capsule; Refill: 2    If protocol passes may refill for 12 months if within 3 months of last provider visit (or a total of 15 months).             Passed - Blood pressure filed in past 12 months     BP Readings from Last 1 Encounters:   08/03/20 150/88

## 2021-06-17 NOTE — PROGRESS NOTES
S:  Very pleasant 76-year-old female with past history of COPD, asthma, arthritis and atrial fibrillation, presents for discussion of her chronic medical issues.  ROS: Negative for chest pain shortness of breath or edema.  SH: Former smoker.    Her asthma is stable on Pulmicort.  Her atrial fibrillation is stable on flecainide and diltiazem.  She has a cardiology appointment scheduled for August.    Medications: Albuterol as needed, Pulmicort, diltiazem, flecainide, Xarelto    O:   Blood pressure 128/86, pulse 75 respiration 12, weight 156  Alert conversant no distress  Skin-Pink and dry  Heart-irregular regular consistent with atrial fibrillation.  No ankle edema.  Lungs-clear to auscultation    A:   Atrial fibrillation  COPD  Over nourishment    P:   Recommended mammogram patient declined  Continue Pulmicort and albuterol as needed  Continue flecainide and Xarelto and follow-up with cardiology in August  Encouraged weight loss and exercise  Refill diltiazem for hypertension  CBC BMP lipid  RTC 1 year

## 2021-06-18 NOTE — PATIENT INSTRUCTIONS - HE
Patient Instructions by Marquise Castaneda MD at 8/3/2020  1:10 PM     Author: Marquise Castaneda MD Service: -- Author Type: Physician    Filed: 8/3/2020  1:41 PM Encounter Date: 8/3/2020 Status: Signed    : Marquise Castaneda MD (Physician)       It was a pleasure to meet with you today.      Below is a summary of your visit.   1. Continue your medications as prescribed. I have sent renewals for 1 year to [facility] in [employer] on [address]  2. Follow up in 1 year or sooner if needed.       Please do not hesitate to call the [facility] clinic with any questions or concerns at [phone].    Sincerely,

## 2021-06-19 NOTE — LETTER
Letter by Johana Sandoval MD at      Author: Johana Sandoval MD Service: -- Author Type: --    Filed:  Encounter Date: 4/8/2019 Status: (Other)         Samra Gay  [address] [place] [address]            April 8, 2019        Dear Ms. Gay,        Below are the results from your recent visit:    Resulted Orders   Basic Metabolic Panel   Result Value Ref Range    Sodium 142 136 - 145 mmol/L    Potassium 4.8 3.5 - 5.0 mmol/L    Chloride 105 98 - 107 mmol/L    CO2 31 22 - 31 mmol/L    Anion Gap, Calculation 6 5 - 18 mmol/L    Glucose 107 70 - 125 mg/dL    Calcium 9.5 8.5 - 10.5 mg/dL    BUN 11 8 - 28 mg/dL    Creatinine 0.81 0.60 - 1.10 mg/dL    GFR MDRD Af Amer >60 >60 mL/min/1.73m2    GFR MDRD Non Af Amer >60 >60 mL/min/1.73m2    Narrative    Fasting Glucose reference range is 70-99 mg/dL per  American Diabetes Association (ADA) guidelines.   HM2(CBC w/o Differential)   Result Value Ref Range    WBC 8.4 4.0 - 11.0 thou/uL    RBC 4.61 3.80 - 5.40 mill/uL    Hemoglobin 13.4 12.0 - 16.0 g/dL    Hematocrit 40.5 35.0 - 47.0 %    MCV 88 80 - 100 fL    MCH 29.0 27.0 - 34.0 pg    MCHC 33.0 32.0 - 36.0 g/dL    RDW 11.9 11.0 - 14.5 %    Platelets 294 140 - 440 thou/uL    MPV 8.3 7.0 - 10.0 fL         Samra, your laboratory results are normal.  That is good news.  Thank you for coming in to visit.  We should visit again in 6 months.      Sincerely,        PRUDENCE Sandoval MD, SUPA  [facility]  [phone]

## 2021-06-21 NOTE — LETTER
Letter by Johana Sandoval MD at      Author: Johana Sandoval MD Service: -- Author Type: --    Filed:  Encounter Date: 5/25/2021 Status: (Other)         Samra Gay  [address]  [place] [address]            May 25, 2021        Dear Ms. Gay,        Below are the results from your recent visit:    Resulted Orders   HM2(CBC w/o Differential)   Result Value Ref Range    WBC 9.3 4.0 - 11.0 thou/uL    RBC 4.72 3.80 - 5.40 mill/uL    Hemoglobin 13.4 12.0 - 16.0 g/dL    Hematocrit 42.0 35.0 - 47.0 %    MCV 89 80 - 100 fL    MCH 28.4 27.0 - 34.0 pg    MCHC 31.9 (L) 32.0 - 36.0 g/dL    RDW 12.3 11.0 - 14.5 %    Platelets 323 140 - 440 thou/uL    MPV 10.2 (H) 7.0 - 10.0 fL   Basic Metabolic Panel   Result Value Ref Range    Sodium 145 136 - 145 mmol/L    Potassium 3.6 3.5 - 5.0 mmol/L    Chloride 107 98 - 107 mmol/L    CO2 27 22 - 31 mmol/L    Anion Gap, Calculation 11 5 - 18 mmol/L    Glucose 108 70 - 125 mg/dL    Calcium 8.4 (L) 8.5 - 10.5 mg/dL    BUN 14 8 - 28 mg/dL    Creatinine 0.78 0.60 - 1.10 mg/dL    GFR MDRD Af Amer >60 >60 mL/min/1.73m2    GFR MDRD Non Af Amer >60 >60 mL/min/1.73m2    Narrative    Fasting Glucose reference range is 70-99 mg/dL per  American Diabetes Association (ADA) guidelines.   Lipid Cascade   Result Value Ref Range    Cholesterol 221 (H) <=199 mg/dL    Triglycerides 103 <=149 mg/dL    HDL Cholesterol 60 >=50 mg/dL    LDL Calculated 140 (H) <=129 mg/dL    Patient Fasting > 8hrs? Yes          Samra, your laboratory results look good.  Your cholesterol is slightly high and I would encourage you to work on a high-fiber low-fat diet.  Otherwise keep up your normal health routine.  You should plan to return in 1 year.    Sincerely,        PRUDENCE Sandoval MD, SUPA  [facility]  [phone]

## 2021-06-23 NOTE — PROGRESS NOTES
"Chief Complaint   Patient presents with     Hospital Visit Follow Up     Afib 1/15/2019       HPI: Samra comes in today for hospital follow-up.  She was hospitalized at [facility] from 1/13/19 to 1/15/19 with atrial fibrillation and COPD exacerbation.  She was discharged on diltiazem for rate control, Xarelto for stroke prevention, as well as prednisone, and budesonide.  She was also given Zithromax and albuterol.  She is doing well, has no chest pain or shortness of breath.    ROS: No chest pain shortness of breath palpitations wheezing or cough.    SH:    reports that she quit smoking about 40 years ago. she has never used smokeless tobacco. She reports that she does not drink alcohol or use drugs.      FH: The Patient's family history is not on file.     Meds:  Samra has a current medication list which includes the following prescription(s): albuterol, albuterol, albuterol, budesonide, cholecalciferol (vitamin d3), diltiazem, guaifenesin er, hydroxyzine pamoate, prednisone, rivaroxaban, ubidecarenone, and citalopram.    O:  /78   Pulse 65   Resp 18   Ht 5' 1.25\" (1.556 m)   Wt 149 lb (67.6 kg)   SpO2 94%   BMI 27.92 kg/m     Constitutional:    --Vitals as above  --No acute distress  Eyes-  --Sclera noninjected  --Lids and conjunctiva normal  ENT-  --TMs clear  --Sclera noninjected  --Pharynx not erythematous  Neck-  --Neck supple    Lymph-  --No cervical lymphadenopathy  Lungs-  --Clear to Auscultation  Heart-  --irregularly irregular rate and rhythm  Skin-  --Pink and dry          A/P:   1. Hypoxia  - diltiazem (CARDIZEM CD) 180 MG 24 hr capsule; Take 1 capsule (180 mg total) by mouth daily.  Dispense: 90 capsule; Refill: 1  - rivaroxaban 20 mg Tab; Take 1 tablet (20 mg total) by mouth daily with supper.  Dispense: 90 tablet; Refill: 1  - budesonide (PULMICORT) 0.5 mg/2 mL nebulizer solution; Take 2 mL (0.5 mg total) by nebulization 2 (two) times a day.  Dispense: 120 mL; Refill: 2    2. Atrial " fibrillation with RVR (H)  Patient has a appointment in 4 weeks with cardiology and recommend that she continue with that appointment and she is to follow-up with us if chest pain or shortness of breath occurs  - diltiazem (CARDIZEM CD) 180 MG 24 hr capsule; Take 1 capsule (180 mg total) by mouth daily.  Dispense: 90 capsule; Refill: 1    3. Anxiety  We discussed her new problem of anxiety.  Will prescribe Celexa and she will follow-up in 4 weeks  - citalopram (CELEXA) 10 MG tablet; Take 1 tablet (10 mg total) by mouth daily.  Dispense: 30 tablet; Refill: 2

## 2021-06-23 NOTE — PROGRESS NOTES
Received intake call for home oxygen at 10:37AM. Reviewed patient's chart.  01/15/2019 -  - RECEIVED O2 INTAKE NEED ORDER TO BE SIGNED.  11:11AM - CALLED GIANNA TO HAVE PROVIDER SIGN ORDER. NO ANSWER LEFT VM.  11:16AM - CALLED PATIENT TO OFFER CHOICE. SHE IS OK WITH [facility]. DISCUSSED EQUIPMENT SHE WOULD LIKE POC. WILL DELIVER POC TO BEDSIDE ONCE ORDER IS SIGNED.   11:32AM - CALLED GINANA AGAIN NO ANSWER. CALLED [facility] AND WAS UNABLE TO COMPLETE CALL.  11:36AM - CALLED [facility] AGAIN SPOKE WITH IMAN. CONFIRMED PATIENT DOES NEED NEB. TOLD HER WE JUST NEED PROVIDER TO SIGN ORDER.  11:50AM - ORDER HAS BEEN SIGNED. NEEDING PROVIDER TO ADD PATIENT NEEDS NEB TO DISCHARGE NOTES. CALLED [employer] TO REQUEST GIANNA CALL ME BACK AND GAVE DIRECT NUMBER.  11:56AM - RECEIVED CALL FROM GIANNA, SHE TOLD ME TO CONTACT HER DIRECTLY VIA [employer] IN THE FUTURE. TOLD HER WE JUST NEED PROVIDER TO ADD NEB TO DISCHARGE NOTES. OTHERWISE [facility] IS READY TO GO.

## 2021-06-23 NOTE — TELEPHONE ENCOUNTER
Left message to call back for: patient  Information to relay to patient:  She should call the oxygen company and tell them she doesn't want it. If she would like to get a letter(if they require one) she may need to schedule an appointment.

## 2021-06-23 NOTE — TELEPHONE ENCOUNTER
Patient stopped in today wondering if she needed an order to return her oxygen equipment. She states that she is not using it. Please call her to advise either way. Thank you.
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
## 2021-06-25 NOTE — TELEPHONE ENCOUNTER
Refill request already responded to.  Diltiazem filled 5/24/2021 #90 tablets with 3 refills.    Lora Chavez, DIDIER  Triage Nurse Advisor

## 2021-06-25 NOTE — TELEPHONE ENCOUNTER
Refill Approved    Rx renewed per Medication Renewal Policy. Medication was last renewed on .1/21/19    Dunia Gtz, Care Connection Triage/Med Refill 3/17/2019     Requested Prescriptions   Pending Prescriptions Disp Refills     budesonide (PULMICORT) 0.5 mg/2 mL nebulizer solution 360 mL 0     Sig: Take 2 mL (0.5 mg total) by nebulization 2 (two) times a day.    Asthma Medications Refill Protocol Passed - 3/15/2019  1:43 PM       Passed - PCP or prescribing provider visit in last year    Last office visit with prescriber/PCP: 1/21/2019 Johana Sandoval MD OR same dept: 1/21/2019 Johana Sandoval MD OR same specialty: 1/21/2019 Johana Sandoval MD  Last physical: Visit date not found Last MTM visit: Visit date not found    Next appt within 3 mo: Visit date not found Next physical within 3 mo: Visit date not found  Prescriber OR PCP: Johana Sandoval MD  Last diagnosis associated with med order: 1. Hypoxia  - budesonide (PULMICORT) 0.5 mg/2 mL nebulizer solution; Take 2 mL (0.5 mg total) by nebulization 2 (two) times a day.  Dispense: 360 mL; Refill: 0    If protocol passes may refill for 6 months if within 3 months of last provider visit (or a total of 9 months).

## 2021-06-25 NOTE — TELEPHONE ENCOUNTER
"Refill Approved    Rx renewed per Medication Renewal Policy. Medication was last renewed on 5/24/2021 - but written as \"No Print,\" but PCP documented that he efilled this medication.    Mary Pineda Connection Triage/Med Refill 6/1/2021     Requested Prescriptions   Pending Prescriptions Disp Refills     diltiazem (CARDIZEM CD) 180 MG 24 hr capsule 90 capsule 3     Sig: Take 1 capsule (180 mg total) by mouth daily.       Calcium-Channel Blockers Protocol Passed - 6/1/2021  9:21 AM        Passed - PCP or prescribing provider visit in past 12 months or next 3 months     Last office visit with prescriber/PCP: Visit date not found OR same dept: Visit date not found OR same specialty: Visit date not found  Last physical: Visit date not found Last MTM visit: Visit date not found   Next visit within 3 mo: Visit date not found  Next physical within 3 mo: Visit date not found  Prescriber OR PCP: Emily Abebe RN  Last diagnosis associated with med order: 1. Hypoxia  - diltiazem (CARDIZEM CD) 180 MG 24 hr capsule; Take 1 capsule (180 mg total) by mouth daily.  Dispense: 90 capsule; Refill: 3    2. Atrial fibrillation with RVR (H)  - diltiazem (CARDIZEM CD) 180 MG 24 hr capsule; Take 1 capsule (180 mg total) by mouth daily.  Dispense: 90 capsule; Refill: 3    If protocol passes may refill for 12 months if within 3 months of last provider visit (or a total of 15 months).             Passed - Blood pressure filed in past 12 months     BP Readings from Last 1 Encounters:   05/24/21 128/86                            "

## 2021-06-25 NOTE — TELEPHONE ENCOUNTER
Refill request for Ditiazem - it looks like it was authorized on 5/24/2021, but was written by provider as no print, so pharmacy never received it.    Pharmacy would like it to be re-sent.  Emily Abebe RN 06/01/21 9:17 AM  [facility] Nurse Advisor
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
## 2021-07-05 ENCOUNTER — RECORDS - HEALTHEAST (OUTPATIENT)
Dept: ADMINISTRATIVE | Facility: OTHER | Age: 76
End: 2021-07-05

## 2021-07-05 DIAGNOSIS — I48.0 PAROXYSMAL ATRIAL FIBRILLATION (H): ICD-10-CM

## 2021-07-05 RX ORDER — FLECAINIDE ACETATE 100 MG/1
TABLET ORAL
Qty: 180 TABLET | Refills: 1 | Status: SHIPPED | OUTPATIENT
Start: 2021-07-05 | End: 2021-12-30

## 2021-07-06 VITALS
OXYGEN SATURATION: 96 % | HEART RATE: 75 BPM | WEIGHT: 156 LBS | HEIGHT: 61 IN | BODY MASS INDEX: 29.45 KG/M2 | DIASTOLIC BLOOD PRESSURE: 86 MMHG | SYSTOLIC BLOOD PRESSURE: 128 MMHG

## 2021-07-06 ASSESSMENT — PATIENT HEALTH QUESTIONNAIRE - PHQ9: SUM OF ALL RESPONSES TO PHQ QUESTIONS 1-9: 7

## 2021-07-08 ASSESSMENT — ASTHMA QUESTIONNAIRES: ACT_TOTALSCORE: 22

## 2021-11-16 DIAGNOSIS — R09.02 HYPOXIA: ICD-10-CM

## 2021-11-26 ENCOUNTER — IMMUNIZATION (OUTPATIENT)
Dept: NURSING | Facility: CLINIC | Age: 76
End: 2021-11-26
Payer: COMMERCIAL

## 2021-11-26 PROCEDURE — 91300 PR COVID VAC PFIZER DIL RECON 30 MCG/0.3 ML IM: CPT

## 2021-11-26 PROCEDURE — 0004A PR COVID VAC PFIZER DIL RECON 30 MCG/0.3 ML IM: CPT

## 2021-12-08 ENCOUNTER — TELEPHONE (OUTPATIENT)
Dept: FAMILY MEDICINE | Facility: CLINIC | Age: 76
End: 2021-12-08
Payer: COMMERCIAL

## 2021-12-08 ENCOUNTER — TELEPHONE (OUTPATIENT)
Dept: URGENT CARE | Facility: URGENT CARE | Age: 76
End: 2021-12-08
Payer: COMMERCIAL

## 2021-12-08 NOTE — TELEPHONE ENCOUNTER
Left message to call back for: Samra  Information to relay to patient: Pt has a visit on 12/16/21 that needs to be rescheduled to a 40 min time slot due to est care with complex needs per provider.

## 2021-12-08 NOTE — TELEPHONE ENCOUNTER
This appt was rescheduled for the appropriate timeframe by other clinic staff. Pt notified that it's correct. We'll see her on  12.30.21

## 2021-12-08 NOTE — TELEPHONE ENCOUNTER
Reason for Call:  Appointment note request is for 40 min est care, Requested Provider:  Ildefonso Vega NP    PCP: Johana Sandoval    Reason for visit: Est care     Duration of symptoms: n/a    Have you been treated for this in the past? n/a    Additional comments: Note for appt is asking for 40 minute est care but [facility] does not respond to this per writer. Please call the patient to reschedule.     Can we leave a detailed message on this number? YES    Phone number patient can be reached at: Cell number on file:    Telephone Information:   Mobile [phone]       Best Time: as soon as possible for best date and times     Call taken on 12/8/2021 at 10:04 AM by Jodi Sanchez

## 2021-12-30 ENCOUNTER — OFFICE VISIT (OUTPATIENT)
Dept: FAMILY MEDICINE | Facility: CLINIC | Age: 76
End: 2021-12-30
Payer: COMMERCIAL

## 2021-12-30 VITALS
HEART RATE: 60 BPM | HEIGHT: 61 IN | WEIGHT: 161 LBS | OXYGEN SATURATION: 94 % | BODY MASS INDEX: 30.4 KG/M2 | DIASTOLIC BLOOD PRESSURE: 84 MMHG | SYSTOLIC BLOOD PRESSURE: 130 MMHG

## 2021-12-30 DIAGNOSIS — I10 ESSENTIAL (PRIMARY) HYPERTENSION: Primary | ICD-10-CM

## 2021-12-30 DIAGNOSIS — E78.01 FAMILIAL HYPERCHOLESTEROLEMIA: ICD-10-CM

## 2021-12-30 DIAGNOSIS — Z87.891 AGGRESSIVE FORMER SMOKER: ICD-10-CM

## 2021-12-30 DIAGNOSIS — I48.91 ATRIAL FIBRILLATION WITH RVR (H): ICD-10-CM

## 2021-12-30 DIAGNOSIS — J44.9 CHRONIC OBSTRUCTIVE PULMONARY DISEASE, UNSPECIFIED COPD TYPE (H): ICD-10-CM

## 2021-12-30 DIAGNOSIS — Z23 NEED FOR VACCINATION: ICD-10-CM

## 2021-12-30 DIAGNOSIS — Z76.89 ESTABLISHING CARE WITH NEW DOCTOR, ENCOUNTER FOR: ICD-10-CM

## 2021-12-30 DIAGNOSIS — Z78.0 POSTMENOPAUSAL STATUS: ICD-10-CM

## 2021-12-30 DIAGNOSIS — Z71.89 ACP (ADVANCE CARE PLANNING): ICD-10-CM

## 2021-12-30 PROCEDURE — 90670 PCV13 VACCINE IM: CPT | Performed by: NURSE PRACTITIONER

## 2021-12-30 PROCEDURE — G0009 ADMIN PNEUMOCOCCAL VACCINE: HCPCS | Performed by: NURSE PRACTITIONER

## 2021-12-30 PROCEDURE — 36415 COLL VENOUS BLD VENIPUNCTURE: CPT | Performed by: NURSE PRACTITIONER

## 2021-12-30 PROCEDURE — 99214 OFFICE O/P EST MOD 30 MIN: CPT | Mod: 25 | Performed by: NURSE PRACTITIONER

## 2021-12-30 PROCEDURE — 80053 COMPREHEN METABOLIC PANEL: CPT | Performed by: NURSE PRACTITIONER

## 2021-12-30 RX ORDER — ATORVASTATIN CALCIUM 10 MG/1
10 TABLET, FILM COATED ORAL DAILY
Qty: 90 TABLET | Refills: 0 | Status: SHIPPED | OUTPATIENT
Start: 2021-12-30 | End: 2022-03-22

## 2021-12-30 ASSESSMENT — MIFFLIN-ST. JEOR: SCORE: 1152.67

## 2021-12-30 NOTE — PROGRESS NOTES
Assessment & Plan     Essential (primary) hypertension    - Comprehensive metabolic panel (BMP + Alb, Alk Phos, ALT, AST, Total. Bili, TP)  Well-controlled on current medications, no adjustments made today.  She was advised to continue watching her sodium intake and spot check her blood pressure at home if she is feeling symptomatic    Familial hypercholesterolemia    - atorvastatin (LIPITOR) 10 MG tablet  Dispense: 90 tablet; Refill: 0  Current ASCVD risk were 26% as of her results 7 months ago.  Recommend starting low-dose statin to target the total cholesterol and LDL, patient agrees and a prescription was sent.  She was advised to follow-up in 3 months for fasting lab work with lab appointment only    Atrial fibrillation with RVR (H)    Managed by cardiology, she will continue with diltiazem, flecainide and Xarelto.    Chronic obstructive pulmonary disease, unspecified COPD type (H)    COPD action plan updated today.  She is not oxygen dependent.  She will continue with albuterol inhaler as needed every 4 hours for wheezing/shortness of breath.  Continue with Pulmicort nebulizers up to twice daily  These medications were prescribed to her many years ago during a hospitalization, she has never seen a lung specialist and she has never completed spirometry.  She is not interested in the lung cancer screening, she states that she would never    Aggressive former smoker    Complete chemotherapy or radiation if she were to be diagnosed with lung cancer.  She does have an 87-pack-year history of smoking, she smoked for almost 30 years and smoked 3 packs a day  She did quit smoking in 1979    Postmenopausal status    - DX Hip/Pelvis/Spine she is currently taking her vitamin D3 daily but admits to only taking calcium maybe twice per week.  I did advise her to start taking 600 mg daily and we will see what the results of her bone scan show.  She has no current history of spontaneous fracture.    Need for  "vaccination    - PNEUMOCOCCAL CONJ VACCINE 13 VALENT IM  She declined influenza vaccination    ACP (advance care planning)    She states she does have a healthcare directive at home and on file, I did advise her to bring it to the clinic so we get the scanned into her chart.    Establishing care with new doctor, encounter for    - REVIEW OF HEALTH MAINTENANCE PROTOCOL ORDERS               BMI:   Estimated body mass index is 30.42 kg/m  as calculated from the following:    Height as of this encounter: 1.549 m (5' 1\").    Weight as of this encounter: 73 kg (161 lb).   Weight management plan: Discussed healthy diet and exercise guidelines    Regular exercise  See Patient Instructions    Return in about 3 months (around 3/30/2022) for Follow up, lab only for cholesterol check, started statin Rx in December. Med check 6 months.    Silvia Fregoso NP  [facility]    Stevenson Cabrales is a 76 year old who presents for the following health issues     HPI     Hypertension Follow-up      Do you check your blood pressure regularly outside of the clinic? Yes     Are you following a low salt diet? No    Are your blood pressures ever more than 140 on the top number (systolic) OR more   than 90 on the bottom number (diastolic), for example 140/90? No   Rx's: Diltiazem 180mg daily  Denies radiation, diaphoresis, shortness of breath, dizziness, syncope, nausea, palpitations, and associated with activity.   Smoker: former, quit in 1979, 87 year pack history  Exercise: lots of stairs, stays active  No history of CAD or stroke  Her Afib is managed by cardiology and she is compliant with taking her Flecainide and Xarelto for management of this  Not currently taking a statin, her last LDL and total cholesterol were elevated 7 months ago  The 10-year ASCVD risk score (Tera DC Jr., et al., 2013) is: 26.6%    Values used to calculate the score:      Age: 76 years      Sex: Female      Is Non-  " American: No      Diabetic: No      Tobacco smoker: No      Systolic Blood Pressure: 130 mmHg      Is BP treated: Yes      HDL Cholesterol: 60 mg/dL      Total Cholesterol: 221 mg/dL      COPD Follow-Up    Overall, how are your COPD symptoms since your last clinic visit?  No change    How much fatigue or shortness of breath do you have when you are walking?  Same as usual    How much shortness of breath do you have when you are resting?  None    How often do you cough? Sometimes    Have you noticed any change in your sputum/phlegm?  No    Have you experienced a recent fever? No    Please describe how far you can walk without stopping to rest:  Less than 1 block    How many flights of stairs are you able to walk up without stopping?  1    Have you had any Emergency Room Visits, Urgent Care Visits, or Hospital Admissions because of your COPD since your last office visit?  No     Has never seen a lung specialist    She is not able to recall who actually initiated her Albuterol INH and Pulmicort nebs. The last time she used her neb was this morning. She is nebbing twice daily. She is using her Albuterol INH twice weekly    She has never undergone spirometry per chart review    History   Smoking Status     Former Smoker     Packs/day: 3.00     Years: 29.00     Quit date: 1/1/1979   Smokeless Tobacco     Never Used     No results found for: FEV1, [id number]      How many servings of fruits and vegetables do you eat daily?  2-3    On average, how many sweetened beverages do you drink each day (Examples: soda, juice, sweet tea, etc.  Do NOT count diet or artificially sweetened beverages)?   0    How many days per week do you exercise enough to make your heart beat faster? 3 or less    How many minutes a day do you exercise enough to make your heart beat faster? 9 or less    How many days per week do you miss taking your medication? 0        Review of Systems   CONSTITUTIONAL: NEGATIVE for fever, chills, change in  "weight  ENT/MOUTH: NEGATIVE for ear, mouth and throat problems  CV: NEGATIVE for chest pain, palpitations or peripheral edema      Objective    /84 (BP Location: Right arm, Patient Position: Sitting, Cuff Size: Adult Regular)   Pulse 60   Ht 1.549 m (5' 1\")   Wt 73 kg (161 lb)   SpO2 94%   Breastfeeding No   BMI 30.42 kg/m    Body mass index is 30.42 kg/m .  Physical Exam   GENERAL: healthy, alert and no distress  EYES: Eyes grossly normal to inspection, PERRL and conjunctivae and sclerae normal  NECK: no adenopathy, no asymmetry, masses, or scars and thyroid normal to palpation  RESP: Lung sound decreased in all lung fields, no rales, rhonchi or wheezes  CV: regular rate and rhythm, normal S1 S2, no S3 or S4, no murmur, click or rub, no peripheral edema and peripheral pulses strong  SKIN: no suspicious lesions or rashes, warm and dry. Scalp with mild redness, dry and flakey  NEURO: Normal strength and tone, mentation intact and speech normal                "

## 2021-12-30 NOTE — LETTER
My COPD Action Plan     Name: Samra Gay    YOB: 1945   Date: 12/30/2021    My doctor: Silvia Fregoso NP   My clinic: [address] [facility]  [facility] [id number] [phone]  My Controller Medicine: Budesonide (Pulmicort)   Dose: nebs twice daily     My Rescue Medicine: Albuterol (Proair/Ventolin/Proventil) inhaler   Dose: every 4 hours PRN, 2 puffs     My Flare Up Medicine: None   Dose:      My COPD Severity: No PFT's on file      Use of Oxygen: Oxygen Not Prescribed      Make sure you've had your pneumonia   vaccines.          GREEN ZONE       Doing well today      Usual level of activity and exercise    Usual amount of cough and mucus    No shortness of breath    Usual level of health (thinking clearly, sleeping well, feel like eating) Actions:      Take daily medicines    Use oxygen as prescribed    Follow regular exercise and diet plan    Avoid cigarette smoke and other irritants that harm the lungs           YELLOW ZONE          Having a bad day or flare up      Short of breath more than usual    A lot more sputum (mucus) than usual    Sputum looks yellow, green, tan, brown or bloody    More coughing or wheezing    Fever or chills    Less energy; trouble completing activities    Trouble thinking or focusing    Using quick relief inhaler or nebulizer more often    Poor sleep; symptoms wake me up    Do not feel like eating Actions:      Get plenty of rest    Take daily medicines    Use quick relief inhaler every 4 hours    If you use oxygen, call you doctor to see if you should adjust your oxygen    Do breathing exercises or other things to help you relax    Let a loved one, friend or neighbor know you are feeling worse    Call your care team if you have 2 or more symptoms.  Start taking steroids or antibiotics if directed by your care team           RED ZONE       Need medical care now      Severe  shortness of breath (feel you can't breathe)    Fever, chills    Not enough breath to do any activity    Trouble coughing up mucus, walking or talking    Blood in mucus    Frequent coughing   Rescue medicines are not working    Not able to sleep because of breathing    Feel confused or drowsy    Chest pain    Actions:      Call your health care team.  If you cannot reach your care team, call 911 or go to the emergency room.        Annual Reminders:  Meet with Care Team, Flu Shot every Fall  Pharmacy: [id number] [place] [address]

## 2021-12-31 LAB
ALBUMIN SERPL-MCNC: 4.1 G/DL (ref 3.5–5)
ALP SERPL-CCNC: 84 U/L (ref 45–120)
ALT SERPL W P-5'-P-CCNC: 14 U/L (ref 0–45)
ANION GAP SERPL CALCULATED.3IONS-SCNC: 11 MMOL/L (ref 5–18)
AST SERPL W P-5'-P-CCNC: 15 U/L (ref 0–40)
BILIRUB SERPL-MCNC: 0.6 MG/DL (ref 0–1)
BUN SERPL-MCNC: 12 MG/DL (ref 8–28)
CALCIUM SERPL-MCNC: 9.3 MG/DL (ref 8.5–10.5)
CHLORIDE BLD-SCNC: 102 MMOL/L (ref 98–107)
CO2 SERPL-SCNC: 28 MMOL/L (ref 22–31)
CREAT SERPL-MCNC: 0.76 MG/DL (ref 0.6–1.1)
GFR SERPL CREATININE-BSD FRML MDRD: 80 ML/MIN/1.73M2
GLUCOSE BLD-MCNC: 90 MG/DL (ref 70–125)
POTASSIUM BLD-SCNC: 4.1 MMOL/L (ref 3.5–5)
PROT SERPL-MCNC: 6.9 G/DL (ref 6–8)
SODIUM SERPL-SCNC: 141 MMOL/L (ref 136–145)

## 2022-01-03 DIAGNOSIS — I48.0 PAROXYSMAL ATRIAL FIBRILLATION (H): ICD-10-CM

## 2022-01-03 RX ORDER — FLECAINIDE ACETATE 100 MG/1
100 TABLET ORAL 2 TIMES DAILY
Qty: 180 TABLET | Refills: 0 | Status: SHIPPED | OUTPATIENT
Start: 2022-01-03 | End: 2022-01-13

## 2022-01-13 ENCOUNTER — ANCILLARY PROCEDURE (OUTPATIENT)
Dept: BONE DENSITY | Facility: CLINIC | Age: 77
End: 2022-01-13
Attending: NURSE PRACTITIONER
Payer: COMMERCIAL

## 2022-01-13 ENCOUNTER — OFFICE VISIT (OUTPATIENT)
Dept: CARDIOLOGY | Facility: CLINIC | Age: 77
End: 2022-01-13
Payer: COMMERCIAL

## 2022-01-13 VITALS
SYSTOLIC BLOOD PRESSURE: 156 MMHG | DIASTOLIC BLOOD PRESSURE: 80 MMHG | RESPIRATION RATE: 16 BRPM | WEIGHT: 159 LBS | HEART RATE: 72 BPM | BODY MASS INDEX: 30.04 KG/M2

## 2022-01-13 DIAGNOSIS — I10 ESSENTIAL HYPERTENSION: ICD-10-CM

## 2022-01-13 DIAGNOSIS — Z78.0 POSTMENOPAUSAL STATUS: ICD-10-CM

## 2022-01-13 DIAGNOSIS — J44.1 COPD EXACERBATION (H): Primary | ICD-10-CM

## 2022-01-13 DIAGNOSIS — I48.0 PAROXYSMAL ATRIAL FIBRILLATION (H): ICD-10-CM

## 2022-01-13 PROCEDURE — 77080 DXA BONE DENSITY AXIAL: CPT | Mod: TC | Performed by: RADIOLOGY

## 2022-01-13 PROCEDURE — 99213 OFFICE O/P EST LOW 20 MIN: CPT | Performed by: INTERNAL MEDICINE

## 2022-01-13 RX ORDER — FLECAINIDE ACETATE 100 MG/1
100 TABLET ORAL 2 TIMES DAILY
Qty: 180 TABLET | Refills: 3 | Status: SHIPPED | OUTPATIENT
Start: 2022-01-13 | End: 2022-05-16

## 2022-01-13 NOTE — PATIENT INSTRUCTIONS
It was a pleasure to meet with you today.      Below is a summary of your visit.   1. Continue your medications without changes.  2. Check your blood pressure at home. If your BP is consistently >130-140 (top number) then we should be adjusting your blood pressure medications.   3. Follow up with me in about 1 year or sooner if needed.    Please do not hesitate to call the [facility] clinic with any questions or concerns at [phone]. You can also reach my nurse, Ade, during normal business hours at [phone].    Sincerely,
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
## 2022-01-15 DIAGNOSIS — M81.0 AGE-RELATED OSTEOPOROSIS WITHOUT CURRENT PATHOLOGICAL FRACTURE: Primary | ICD-10-CM

## 2022-01-15 RX ORDER — ALENDRONATE SODIUM 70 MG/1
70 TABLET ORAL
Qty: 4 TABLET | Refills: 11 | Status: SHIPPED | OUTPATIENT
Start: 2022-01-15 | End: 2022-03-31

## 2022-01-17 ENCOUNTER — TELEPHONE (OUTPATIENT)
Dept: FAMILY MEDICINE | Facility: CLINIC | Age: 77
End: 2022-01-17
Payer: COMMERCIAL

## 2022-01-17 NOTE — TELEPHONE ENCOUNTER
Contact First & Last Name if other than the patient involved: Pt   Main reason for the request: Pt was advised by Silvia at her last visit that she would need to come back in 3 months for labs, there are no pending orders.  Can we confirm they are needed and contact the pt once done so she can schedule her lab visit.  Expecting call back:YES  May leave message: YES  Please contact Patient at Home  Best time to call back: when able  [phone] (home)  Alternate phone number: n/a  Lili Thurman

## 2022-01-18 DIAGNOSIS — E78.01 FAMILIAL HYPERCHOLESTEROLEMIA: Primary | ICD-10-CM

## 2022-01-18 NOTE — TELEPHONE ENCOUNTER
Yes, fasting lipid is due end of March. Lab order will be placed once she in on the lab schedule in order to prevent multiple lab orders.

## 2022-01-18 NOTE — TELEPHONE ENCOUNTER
12/30/21 clinic note Plan:  Return in about 3 months (around 3/30/2022) for Follow up, lab only for cholesterol check, started statin Rx in December. Med check 6 months.       Please place orders for lab appt scheduling

## 2022-01-25 DIAGNOSIS — Z00.00 WELLNESS EXAMINATION: ICD-10-CM

## 2022-01-27 RX ORDER — ALBUTEROL SULFATE 90 UG/1
AEROSOL, METERED RESPIRATORY (INHALATION)
Qty: 18 G | Refills: 5 | Status: SHIPPED | OUTPATIENT
Start: 2022-01-27 | End: 2023-09-05

## 2022-01-27 NOTE — TELEPHONE ENCOUNTER
"Routing refill request to provider for review/approval because:  Needs asthma control score    Last Written Prescription Date:  9/1/2020  Last Fill Quantity: 1,  # refills: 5   Last office visit provider:  12/30/2021     Requested Prescriptions   Pending Prescriptions Disp Refills     albuterol (PROAIR HFA/PROVENTIL HFA/VENTOLIN HFA) 108 (90 Base) MCG/ACT inhaler [Pharmacy Med Name: ALBUTEROL HFA (PROAIR) INHALER] 18 g 5     Sig: INHALE 1-2 PUFFS EVERY 4 (FOUR) HOURS AS NEEDED FOR WHEEZING.       Asthma Maintenance Inhalers - Anticholinergics Failed - 1/25/2022 10:35 AM        Failed - Asthma control assessment score within normal limits in last 6 months     Please review ACT score.           Passed - Patient is age 12 years or older        Passed - Medication is active on med list        Passed - Recent (6 mo) or future (30 days) visit within the authorizing provider's specialty     Patient had office visit in the last 6 months or has a visit in the next 30 days with authorizing provider or within the authorizing provider's specialty.  See \"Patient Info\" tab in inbasket, or \"Choose Columns\" in Meds & Orders section of the refill encounter.           Short-Acting Beta Agonist Inhalers Protocol  Failed - 1/25/2022 10:35 AM        Failed - Asthma control assessment score within normal limits in last 6 months     Please review ACT score.           Passed - Patient is age 12 or older        Passed - Medication is active on med list        Passed - Recent (6 mo) or future (30 days) visit within the authorizing provider's specialty     Patient had office visit in the last 6 months or has a visit in the next 30 days with authorizing provider or within the authorizing provider's specialty.  See \"Patient Info\" tab in inbasket, or \"Choose Columns\" in Meds & Orders section of the refill encounter.                 Latisha Westfall RN 01/26/22 10:47 PM  "

## 2022-03-22 DIAGNOSIS — E78.01 FAMILIAL HYPERCHOLESTEROLEMIA: ICD-10-CM

## 2022-03-22 RX ORDER — ATORVASTATIN CALCIUM 10 MG/1
10 TABLET, FILM COATED ORAL DAILY
Qty: 90 TABLET | Refills: 0 | Status: SHIPPED | OUTPATIENT
Start: 2022-03-22 | End: 2022-10-06 | Stop reason: SINTOL

## 2022-03-22 NOTE — TELEPHONE ENCOUNTER
"Last Written Prescription Date:  12/30/2021  Last Fill Quantity: 90,  # refills: 0   Last office visit provider:   12/30/2021    Requested Prescriptions   Pending Prescriptions Disp Refills     atorvastatin (LIPITOR) 10 MG tablet [Pharmacy Med Name: ATORVASTATIN 10 MG TABLET] 90 tablet 0     Sig: TAKE 1 TABLET (10 MG) BY MOUTH DAILY.       Statins Protocol Passed - 3/22/2022 12:24 AM        Passed - LDL on file in past 12 months     Recent Labs   Lab Test 05/24/21  0804   *             Passed - No abnormal creatine kinase in past 12 months     No lab results found.             Passed - Recent (12 mo) or future (30 days) visit within the authorizing provider's specialty     Patient has had an office visit with the authorizing provider or a provider within the authorizing providers department within the previous 12 mos or has a future within next 30 days. See \"Patient Info\" tab in inbasket, or \"Choose Columns\" in Meds & Orders section of the refill encounter.              Passed - Medication is active on med list        Passed - Patient is age 18 or older        Passed - No active pregnancy on record        Passed - No positive pregnancy test in past 12 months             Violet Way 03/22/22 5:20 PM  "

## 2022-03-28 ENCOUNTER — LAB (OUTPATIENT)
Dept: LAB | Facility: CLINIC | Age: 77
End: 2022-03-28
Payer: COMMERCIAL

## 2022-03-28 DIAGNOSIS — E78.01 FAMILIAL HYPERCHOLESTEROLEMIA: ICD-10-CM

## 2022-03-28 LAB
CHOLEST SERPL-MCNC: 165 MG/DL
FASTING STATUS PATIENT QL REPORTED: NORMAL
HDLC SERPL-MCNC: 64 MG/DL
LDLC SERPL CALC-MCNC: 84 MG/DL
TRIGL SERPL-MCNC: 85 MG/DL

## 2022-03-28 PROCEDURE — 36415 COLL VENOUS BLD VENIPUNCTURE: CPT

## 2022-03-28 PROCEDURE — 80061 LIPID PANEL: CPT

## 2022-03-31 ENCOUNTER — OFFICE VISIT (OUTPATIENT)
Dept: FAMILY MEDICINE | Facility: CLINIC | Age: 77
End: 2022-03-31
Payer: COMMERCIAL

## 2022-03-31 VITALS
HEART RATE: 60 BPM | SYSTOLIC BLOOD PRESSURE: 130 MMHG | DIASTOLIC BLOOD PRESSURE: 70 MMHG | BODY MASS INDEX: 30.04 KG/M2 | OXYGEN SATURATION: 94 % | WEIGHT: 159 LBS

## 2022-03-31 DIAGNOSIS — T88.7XXA MEDICATION SIDE EFFECTS: Primary | ICD-10-CM

## 2022-03-31 DIAGNOSIS — M81.0 AGE-RELATED OSTEOPOROSIS WITHOUT CURRENT PATHOLOGICAL FRACTURE: ICD-10-CM

## 2022-03-31 DIAGNOSIS — E78.01 FAMILIAL HYPERCHOLESTEROLEMIA: ICD-10-CM

## 2022-03-31 PROCEDURE — 99213 OFFICE O/P EST LOW 20 MIN: CPT | Performed by: NURSE PRACTITIONER

## 2022-03-31 NOTE — PROGRESS NOTES
Assessment & Plan     Medication side effects      Familial hypercholesterolemia    The 10-year ASCVD risk score (Tera GALICIA Jr., et al., 2013) is: 29.7%    Values used to calculate the score:      Age: 77 years      Sex: Female      Is Non- : No      Diabetic: No      Tobacco smoker: No      Systolic Blood Pressure: 130 mmHg      Is BP treated: Yes      HDL Cholesterol: 64 mg/dL      Total Cholesterol: 165 mg/dL  I explained to the patient due to her familial hypercholesteremia that her risk for stroke and heart attack is higher without the statin medication on board.  She is willing to work on her diet and exercise but again I explained to her that due to her genetic history, this is something that she will be able to alter on her own.  She is adamantly refusing to try any other statin medications at this time and she is well aware of the risk of stroke and heart attack going without the medication and she is willing to take on the responsibility at this time.  She currently has no red flag symptoms to suggest any neurological changes.  She continues to take her Xarelto daily for management of her atrial fibrillation.  She will follow up with me again in clinic in 6 months for her annual wellness exam and we will recheck her fasting cholesterol at that time as well as be calculating her ASCVD risk score.    Age-related osteoporosis without current pathological fracture    She is refusing to start the Fosamax medication for management of her osteoporosis due to her history of body aches with multiple medications.  I did offer her referral today to osteoporosis specialty to discuss Prolia injections but again she refused this as well.  She will continue to work on her exercise regimen and continue with her calcium and vitamin D supplement.  She will let me know if she changes her mind about the referral        Return in about 6 months (around 9/30/2022) for Follow up, Routine preventive, with  me, in person, fasting lab work.    Silvia Fregoso NP  [facility] DONY Cabrales is a 77 year old who presents for the following health issues     History of Present Illness       Reason for visit:  Discuss medication  Symptoms include:  Muscle ache  Symptom intensity:  Severe  Symptom progression:  Staying the same  Had these symptoms before:  No  What makes it worse:  Moving    She eats 4 or more servings of fruits and vegetables daily.She consumes 0 sweetened beverage(s) daily.She exercises with enough effort to increase her heart rate 30 to 60 minutes per day.  She exercises with enough effort to increase her heart rate 5 days per week.   She is taking medications regularly.       Hyperlipidemia Follow-Up      Are you regularly taking any medication or supplement to lower your cholesterol?   No    Are you having muscle aches or other side effects that you think could be caused by your cholesterol lowering medication?  Yes- body aches so she stopped taking her statin   The 10-year ASCVD risk score (Tera GALICIA Jr., et al., 2013) is: 29.7%    Values used to calculate the score:      Age: 77 years      Sex: Female      Is Non- : No      Diabetic: No      Tobacco smoker: No      Systolic Blood Pressure: 130 mmHg      Is BP treated: Yes      HDL Cholesterol: 64 mg/dL        Total Cholesterol: 165 mg/dL    Smoking: former, quit at age 39    ETOH: socially    No previous history of stroke or CAD, she is taking Xarelto for management of her Atrial fib     Osteoporosis: she never started the Fosamax Rx, she is refusing to at this time. She is not interested in meeting with a specialist to discuss injections. She is taking her Calcium and vit D daily. Currently, she is going up 15 stairs daily along with walking around her home for exercise. No spontaneous fracture history.         Review of Systems   CONSTITUTIONAL: NEGATIVE for fever, chills, change in  weight  ENT/MOUTH: NEGATIVE for ear, mouth and throat problems  RESP: NEGATIVE for significant cough or SOB  CV: NEGATIVE for chest pain, palpitations or peripheral edema      Objective    /70 (BP Location: Left arm, Patient Position: Sitting, Cuff Size: Adult Regular)   Pulse 60   Wt 72.1 kg (159 lb)   SpO2 94%   BMI 30.04 kg/m    Body mass index is 30.04 kg/m .  Physical Exam   GENERAL: healthy, alert and no distress  NECK: no adenopathy, no asymmetry, masses, or scars and thyroid normal to palpation  RESP: lungs clear to auscultation - no rales, rhonchi or wheezes  CV: Irregular  Rhythm, rate controlled,  normal S1 S2, no S3 or S4, no murmur, click or rub  NEURO: Normal strength and tone, mentation intact and speech normal

## 2022-05-26 ENCOUNTER — IMMUNIZATION (OUTPATIENT)
Dept: NURSING | Facility: CLINIC | Age: 77
End: 2022-05-26
Payer: COMMERCIAL

## 2022-05-26 PROCEDURE — 0054A COVID-19,PF,PFIZER (12+ YRS): CPT

## 2022-05-26 PROCEDURE — 91305 COVID-19,PF,PFIZER (12+ YRS): CPT

## 2022-06-09 DIAGNOSIS — I48.91 ATRIAL FIBRILLATION WITH RVR (H): ICD-10-CM

## 2022-06-09 DIAGNOSIS — R09.02 HYPOXIA: ICD-10-CM

## 2022-06-09 RX ORDER — DILTIAZEM HYDROCHLORIDE 180 MG/1
CAPSULE, COATED, EXTENDED RELEASE ORAL
Qty: 90 CAPSULE | Refills: 2 | Status: SHIPPED | OUTPATIENT
Start: 2022-06-09 | End: 2022-08-25

## 2022-06-10 NOTE — TELEPHONE ENCOUNTER
"Last Written Prescription Date:  6/1/21  Last Fill Quantity: 90,  # refills: 3   Last office visit provider:  3/31/22     Requested Prescriptions   Pending Prescriptions Disp Refills     diltiazem ER COATED BEADS (CARDIZEM CD/CARTIA XT) 180 MG 24 hr capsule [Pharmacy Med Name: DILTIAZEM 24H ER(CD) 180 MG CP] 90 capsule 3     Sig: TAKE 1 CAPSULE BY MOUTH EVERY DAY       Calcium Channel Blockers Protocol  Passed - 6/9/2022  7:10 AM        Passed - Blood pressure under 140/90 in past 12 months     BP Readings from Last 3 Encounters:   03/31/22 130/70   01/13/22 (!) 156/80   12/30/21 130/84                 Passed - Normal ALT in past 12 months     Recent Labs   Lab Test 12/30/21  1602   ALT 14             Passed - Recent (12 mo) or future (30 days) visit within the authorizing provider's specialty     Patient has had an office visit with the authorizing provider or a provider within the authorizing providers department within the previous 12 mos or has a future within next 30 days. See \"Patient Info\" tab in inbasket, or \"Choose Columns\" in Meds & Orders section of the refill encounter.              Passed - Medication is active on med list        Passed - Patient is age 18 or older        Passed - No active pregnancy on record        Passed - Normal serum creatinine on file in past 12 months     Recent Labs   Lab Test 12/30/21  1602   CR 0.76       Ok to refill medication if creatinine is low          Passed - No positive pregnancy test in past 12 months             Mary Buchanan 06/09/22 7:12 PM  "

## 2022-08-25 ENCOUNTER — OFFICE VISIT (OUTPATIENT)
Dept: CARDIOLOGY | Facility: CLINIC | Age: 77
End: 2022-08-25
Payer: COMMERCIAL

## 2022-08-25 VITALS
HEART RATE: 71 BPM | SYSTOLIC BLOOD PRESSURE: 144 MMHG | BODY MASS INDEX: 30.04 KG/M2 | DIASTOLIC BLOOD PRESSURE: 68 MMHG | RESPIRATION RATE: 16 BRPM | WEIGHT: 159 LBS

## 2022-08-25 DIAGNOSIS — I10 ESSENTIAL HYPERTENSION: ICD-10-CM

## 2022-08-25 DIAGNOSIS — I48.91 ATRIAL FIBRILLATION WITH RVR (H): ICD-10-CM

## 2022-08-25 DIAGNOSIS — I48.0 PAROXYSMAL ATRIAL FIBRILLATION (H): ICD-10-CM

## 2022-08-25 DIAGNOSIS — R09.02 HYPOXIA: ICD-10-CM

## 2022-08-25 DIAGNOSIS — J44.9 COPD MIXED TYPE (H): ICD-10-CM

## 2022-08-25 PROCEDURE — 99214 OFFICE O/P EST MOD 30 MIN: CPT | Performed by: INTERNAL MEDICINE

## 2022-08-25 RX ORDER — FLECAINIDE ACETATE 100 MG/1
TABLET ORAL
Qty: 180 TABLET | Refills: 3 | Status: SHIPPED | OUTPATIENT
Start: 2022-08-25 | End: 2023-11-17

## 2022-08-25 RX ORDER — DILTIAZEM HYDROCHLORIDE 180 MG/1
180 CAPSULE, COATED, EXTENDED RELEASE ORAL DAILY
Qty: 90 CAPSULE | Refills: 3 | Status: SHIPPED | OUTPATIENT
Start: 2022-08-25 | End: 2023-11-14
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
## 2022-08-25 NOTE — PATIENT INSTRUCTIONS
It was a pleasure to meet with you today.      Below is a summary of your visit.   Continue your medications without changes.  Work on being disciplined with exercising. The more active you are, the better. This may significantly improve your overall energy levels.  Follow up with me in 1 year or sooner if needed.     Please do not hesitate to call the [facility] clinic with any questions or concerns at [phone].     Sincerely,

## 2022-10-06 ENCOUNTER — OFFICE VISIT (OUTPATIENT)
Dept: FAMILY MEDICINE | Facility: CLINIC | Age: 77
End: 2022-10-06
Payer: COMMERCIAL

## 2022-10-06 VITALS
RESPIRATION RATE: 16 BRPM | TEMPERATURE: 98.3 F | HEIGHT: 61 IN | WEIGHT: 160.7 LBS | BODY MASS INDEX: 30.34 KG/M2 | HEART RATE: 65 BPM | OXYGEN SATURATION: 93 % | SYSTOLIC BLOOD PRESSURE: 120 MMHG | DIASTOLIC BLOOD PRESSURE: 80 MMHG

## 2022-10-06 DIAGNOSIS — E78.01 FAMILIAL HYPERCHOLESTEROLEMIA: ICD-10-CM

## 2022-10-06 DIAGNOSIS — Z23 NEED FOR VACCINATION: ICD-10-CM

## 2022-10-06 DIAGNOSIS — R40.0 DAYTIME SLEEPINESS: ICD-10-CM

## 2022-10-06 DIAGNOSIS — H91.93 BILATERAL HEARING LOSS, UNSPECIFIED HEARING LOSS TYPE: ICD-10-CM

## 2022-10-06 DIAGNOSIS — I10 ESSENTIAL (PRIMARY) HYPERTENSION: ICD-10-CM

## 2022-10-06 DIAGNOSIS — Z00.00 ENCOUNTER FOR ANNUAL WELLNESS EXAM IN MEDICARE PATIENT: Primary | ICD-10-CM

## 2022-10-06 DIAGNOSIS — I48.91 ATRIAL FIBRILLATION WITH RVR (H): ICD-10-CM

## 2022-10-06 DIAGNOSIS — R41.3 MEMORY LOSS: ICD-10-CM

## 2022-10-06 DIAGNOSIS — Z11.59 NEED FOR HEPATITIS C SCREENING TEST: ICD-10-CM

## 2022-10-06 LAB
ANION GAP SERPL CALCULATED.3IONS-SCNC: 9 MMOL/L (ref 7–15)
BUN SERPL-MCNC: 14.7 MG/DL (ref 8–23)
CALCIUM SERPL-MCNC: 9.3 MG/DL (ref 8.8–10.2)
CHLORIDE SERPL-SCNC: 101 MMOL/L (ref 98–107)
CHOLEST SERPL-MCNC: 259 MG/DL
CREAT SERPL-MCNC: 0.84 MG/DL (ref 0.51–0.95)
DEPRECATED HCO3 PLAS-SCNC: 30 MMOL/L (ref 22–29)
GFR SERPL CREATININE-BSD FRML MDRD: 71 ML/MIN/1.73M2
GLUCOSE SERPL-MCNC: 102 MG/DL (ref 70–99)
HDLC SERPL-MCNC: 64 MG/DL
HGB BLD-MCNC: 13.4 G/DL (ref 11.7–15.7)
LDLC SERPL CALC-MCNC: 170 MG/DL
NONHDLC SERPL-MCNC: 195 MG/DL
POTASSIUM SERPL-SCNC: 4.6 MMOL/L (ref 3.4–5.3)
SODIUM SERPL-SCNC: 140 MMOL/L (ref 136–145)
TRIGL SERPL-MCNC: 127 MG/DL

## 2022-10-06 PROCEDURE — 99214 OFFICE O/P EST MOD 30 MIN: CPT | Mod: 25 | Performed by: NURSE PRACTITIONER

## 2022-10-06 PROCEDURE — 86803 HEPATITIS C AB TEST: CPT | Performed by: NURSE PRACTITIONER

## 2022-10-06 PROCEDURE — 36415 COLL VENOUS BLD VENIPUNCTURE: CPT | Performed by: NURSE PRACTITIONER

## 2022-10-06 PROCEDURE — G0439 PPPS, SUBSEQ VISIT: HCPCS | Performed by: NURSE PRACTITIONER

## 2022-10-06 PROCEDURE — 80061 LIPID PANEL: CPT | Performed by: NURSE PRACTITIONER

## 2022-10-06 PROCEDURE — 80048 BASIC METABOLIC PNL TOTAL CA: CPT | Performed by: NURSE PRACTITIONER

## 2022-10-06 PROCEDURE — 85018 HEMOGLOBIN: CPT | Performed by: NURSE PRACTITIONER

## 2022-10-06 ASSESSMENT — ENCOUNTER SYMPTOMS
CHILLS: 0
NERVOUS/ANXIOUS: 0
PARESTHESIAS: 0
SORE THROAT: 0
ABDOMINAL PAIN: 0
WEAKNESS: 0
HEMATURIA: 0
CONSTIPATION: 0
SHORTNESS OF BREATH: 0
HEADACHES: 0
ARTHRALGIAS: 1
DIARRHEA: 0
HEARTBURN: 0
FEVER: 0
HEMATOCHEZIA: 0
DYSURIA: 0
NAUSEA: 0
EYE PAIN: 0
DIZZINESS: 0
PALPITATIONS: 0
COUGH: 0
MYALGIAS: 0
BREAST MASS: 0
FREQUENCY: 0
JOINT SWELLING: 1

## 2022-10-06 ASSESSMENT — PAIN SCALES - GENERAL: PAINLEVEL: NO PAIN (0)

## 2022-10-06 ASSESSMENT — ACTIVITIES OF DAILY LIVING (ADL): CURRENT_FUNCTION: NO ASSISTANCE NEEDED

## 2022-10-06 NOTE — PROGRESS NOTES
"SUBJECTIVE:   Samra is a 77 year old who presents for Preventive Visit, med check, address acute issues today. Due for several vaccines.       Patient has been advised of split billing requirements and indicates understanding: Yes  Are you in the first 12 months of your Medicare coverage?  No    Healthy Habits:     In general, how would you rate your overall health?  Good    Frequency of exercise:  2-3 days/week    Duration of exercise:  15-30 minutes    Do you usually eat at least 4 servings of fruit and vegetables a day, include whole grains    & fiber and avoid regularly eating high fat or \"junk\" foods?  Yes    Taking medications regularly:  Yes    Medication side effects:  Not applicable    Ability to successfully perform activities of daily living:  No assistance needed    Home Safety:  No safety concerns identified    Hearing Impairment:  Feel that people are mumbling or not speaking clearly, need to ask people to speak up or repeat themselves and difficulty understanding soft or whispered speech    In the past 6 months, have you been bothered by leaking of urine?  No    In general, how would you rate your overall mental or emotional health?  Good      PHQ-2 Total Score: 0    Additional concerns today:  No    Do you feel safe in your environment? Yes    Have you ever done Advance Care Planning? (For example, a Health Directive, POLST, or a discussion with a medical provider or your loved ones about your wishes): Yes, patient states has an Advance Care Planning document and will bring a copy to the clinic.       Fall risk  Fallen 2 or more times in the past year?: No  Any fall with injury in the past year?: No    Cognitive Screening   1) Repeat 3 items (Leader, Season, Table)    2) Clock draw: NORMAL  3) 3 item recall: Recalls NO objects   Results: 0 items recalled: PROBABLE COGNITIVE IMPAIRMENT, **INFORM PROVIDER**    Mini-CogTM Copyright S Kiara. Licensed by the author for use in [facility]; " reprinted with permission (fannie@.[facility]). All rights reserved.      Daytime sleepiness: cardiology recommended having her undergo a sleep study but she declined. She is up about 1 to 4 times per night using the restroom so her sleep is regularly interrupted. She has been trying to limit her fluids after dinner to prevent her from going to the bathroom so much at night. She is still refusing to proceed with a sleep study at this time.     Reviewed and updated as needed this visit by clinical staff   Tobacco  Allergies  Meds                Reviewed and updated as needed this visit by Provider                   Social History     Tobacco Use     Smoking status: Former Smoker     Packs/day: 3.00     Years: 29.00     Pack years: 87.00     Quit date: 1979     Years since quittin.7     Smokeless tobacco: Never Used   Substance Use Topics     Alcohol use: Yes     Comment: rare     If you drink alcohol do you typically have >3 drinks per day or >7 drinks per week? Not applicable    Alcohol Use 10/6/2022   Prescreen: >3 drinks/day or >7 drinks/week? Not Applicable       Hyperlipidemia Follow-Up      Are you regularly taking any medication or supplement to lower your cholesterol?  No, she never started the statin Rx I recommended.     Are you having muscle aches or other side effects that you think could be caused by your cholesterol lowering medication?  Yes which is also why she stopped taking it     No history of previous stroke or heart attack    Smoker: former, quit in     Hypertension Follow-up      Do you check your blood pressure regularly outside of the clinic? Yes     Are you following a low salt diet? Yes    Are your blood pressures ever more than 140 on the top number (systolic) OR more   than 90 on the bottom number (diastolic), for example 140/90? No   Rx: Diltiazem ER  Afib: managed by cardiology, see summary of  below     77 year old female with a significant past history of atrial  fibrillation and COPD who presents for follow-up of her afib. She is treated with flecainide for rhythm control of her afib and taking Xarelto.      Samra s/o general fatigue and lack of energy today. She falls asleep often throughout the day and is always tired. She has little motivation to get up and be active. Symptoms have been present for the past 6-8 months. She denies exertional cardiorespiratory symptoms. Has not had any medication changes in this timeframe. She does not get exercise.     Other than noted above, Ms. Gay denies any chest pain/pressure/tightness, shortness of breath at rest or with exertion, light headedness/dizziness, pre-syncope, syncope, lower extremity swelling, palpitations, paroxysmal nocturnal dyspnea (PND), or orthopnea.    Memory loss, new: she reports struggling with short term memory for the last couple of years. She has issues with word recall, remembering names. She reports no issues with wandering or getting lost with driving. She lives independently and reports no issues with managing finances, getting groceries or completing all ADLs.     COPD: she continue to use her Pulmicort nebs one to two times daily. She is using the Albuteron INH PRN, typically once daily. She is no longer smoking, she quit back in 1984. She uses her inhaler more when it is humid out or when it gets too dry in her home during the winter. She is due for her flu and COVID booster vaccines.       Current providers sharing in care for this patient include:   Patient Care Team:  Silvia Fregoso NP as PCP - General  Marquise Castaneda MD as Assigned Heart and Vascular Provider  Silvia Fregoso NP as Assigned PCP    The following health maintenance items are reviewed in Epic and correct as of today:  Health Maintenance   Topic Date Due     SPIROMETRY  Never done     HEPATITIS C SCREENING  Never done     ZOSTER IMMUNIZATION (1 of 2) Never done     MEDICARE ANNUAL WELLNESS VISIT  Never done     COVID-19  "Vaccine (5 - Booster for Pfizer series) 07/21/2022     INFLUENZA VACCINE (1) Never done     ANNUAL REVIEW OF HM ORDERS  12/30/2022     Pneumococcal Vaccine: 65+ Years (2 - PPSV23 or PCV20) 12/30/2022     FALL RISK ASSESSMENT  10/06/2023     DTAP/TDAP/TD IMMUNIZATION (2 - Td or Tdap) 07/09/2025     ADVANCE CARE PLANNING  12/30/2026     LIPID  03/28/2027     DEXA  01/13/2037     COPD ACTION PLAN  Completed     PHQ-2 (once per calendar year)  Completed     IPV IMMUNIZATION  Aged Out     MENINGITIS IMMUNIZATION  Aged Out     HEPATITIS B IMMUNIZATION  Aged Out         Mammogram Screening - Mammography discussed and declined  Pertinent mammograms are reviewed under the imaging tab.    Review of Systems   Constitutional: Negative for chills and fever.   HENT: Negative for congestion, ear pain, hearing loss and sore throat.    Eyes: Negative for pain and visual disturbance.   Respiratory: Negative for cough and shortness of breath.    Cardiovascular: Negative for chest pain, palpitations and peripheral edema.   Gastrointestinal: Negative for abdominal pain, constipation, diarrhea, heartburn, hematochezia and nausea.   Breasts:  Negative for tenderness, breast mass and discharge.   Genitourinary: Negative for dysuria, frequency, genital sores, hematuria, pelvic pain, urgency, vaginal bleeding and vaginal discharge.   Musculoskeletal: Positive for arthralgias and joint swelling. Negative for myalgias.   Skin: Positive for rash.   Neurological: Negative for dizziness, weakness, headaches and paresthesias.   Psychiatric/Behavioral: Negative for mood changes. The patient is not nervous/anxious.          OBJECTIVE:   /80 (BP Location: Right arm, Patient Position: Sitting, Cuff Size: Adult Regular)   Pulse 65   Temp 98.3  F (36.8  C) (Oral)   Resp 16   Ht 1.549 m (5' 1\")   Wt 72.9 kg (160 lb 11.2 oz)   SpO2 93%   BMI 30.36 kg/m   Estimated body mass index is 30.36 kg/m  as calculated from the following:    Height as " "of this encounter: 1.549 m (5' 1\").    Weight as of this encounter: 72.9 kg (160 lb 11.2 oz).  Physical Exam  GENERAL APPEARANCE: healthy, alert and no distress  EYES: Eyes grossly normal to inspection, PERRL and conjunctivae and sclerae normal  HENT: ear canals and TM's normal, nose and mouth without ulcers or lesions, oropharynx clear and oral mucous membranes moist  NECK: no adenopathy, no asymmetry, masses, or scars and thyroid normal to palpation  RESP: faint expiratory wheezing in all lung fields, no SOB noted today  CV: regular rate and rhythm, normal S1 S2, no S3 or S4, no murmur, click or rub, no peripheral edema and peripheral pulses strong  ABDOMEN: soft, nontender, no hepatosplenomegaly, no masses and bowel sounds normal  MS: no musculoskeletal defects are noted and gait is age appropriate without ataxia  SKIN: no suspicious lesions or rashes  NEURO: Normal strength and tone, sensory exam grossly normal, mentation intact and speech normal, poor memory recall with words today otherwise answers all questions appropriately and is living independently without difficulty, normal gait  PSYCH: mentation appears normal and affect normal/bright, well groomed, engaged in conversation, good eye contact    Diagnostic Test Results:  Labs reviewed in Epic    ASSESSMENT / PLAN:   (Z00.00) Encounter for annual wellness exam in Medicare patient  (primary encounter diagnosis)  Comment:   Plan: Hemoglobin            (Z11.59) Need for hepatitis C screening test  Comment:   Plan: Hepatitis C Screen Reflex to HCV RNA Quant and         Genotype            (I48.91) Atrial fibrillation with RVR (H)  Comment:   Plan: Managed by cardiology, see plan of care below   on rhythm control with flecainide and stroke prevention with Xarelto. Tolerating well so continue with regimen    (I10) Essential (primary) hypertension  Comment:   Plan: BASIC METABOLIC PANEL        Blood pressure is well controlled so she will continue with her " current regimen    (E78.01) Familial hypercholesterolemia  Comment:   Plan: Lipid Profile        She did not start the statin medication that I sent in for her at her last visit, she decided to try diet modification and exercise so we will see what her fasting lab work shows today.  I did explain to her that I do not suspect that her ASCVD risk or will be below 7% so we will need to have another discussion about the statin medication at that time    (R41.3) Memory loss  Comment:   Plan: Baseline slums test score today 20, she is able to function independently, she is managing her finances without difficulty, she is able to prep her meals and grocery shop without problems.  She has agreed to meet with the memory care clinic for a neuropsych evaluation, she is not currently taking any medications for memory loss.    (H91.93) Bilateral hearing loss, unspecified hearing loss type  Comment:   Plan: Adult Audiology  Referral        No signs of wax impaction on exam today, she has agreed to proceed with a formal hearing evaluation due to her hearing loss bilaterally    (R40.0) Daytime sleepiness  Comment:   Plan: She continues to refuse undergoing a sleep study due to her daytime sleepiness, she continues to blame her daytime sleepiness on being up anywhere from 1-4 times per night to use the restroom.  She has been limiting her fluids after dinner which has somewhat been helpful.  If she changes her mind, I can certainly put in a consult for her to meet with the sleep medicine team but for now she will work on fluid reduction after dinnertime and watching her caffeine intake    (Z23) Need for vaccination  Comment:   Plan: patient declined     Patient has been advised of split billing requirements and indicates understanding: Yes    COUNSELING:  Reviewed preventive health counseling, as reflected in patient instructions       Regular exercise       Healthy diet/nutrition       Vision screening       Hearing  "screening       Dental care       Bladder control       Fall risk prevention       Alcohol Use        Osteoporosis prevention/bone health       Colon cancer screening       Hepatitis C screening       Advanced Planning     Estimated body mass index is 30.36 kg/m  as calculated from the following:    Height as of this encounter: 1.549 m (5' 1\").    Weight as of this encounter: 72.9 kg (160 lb 11.2 oz).    Weight management plan: Discussed healthy diet and exercise guidelines    She reports that she quit smoking about 43 years ago. She has a 87.00 pack-year smoking history. She has never used smokeless tobacco.      Appropriate preventive services were discussed with this patient, including applicable screening as appropriate for cardiovascular disease, diabetes, osteopenia/osteoporosis, and glaucoma.  As appropriate for age/gender, discussed screening for colorectal cancer, prostate cancer, breast cancer, and cervical cancer. Checklist reviewing preventive services available has been given to the patient.    Reviewed patients plan of care and provided an AVS. The Basic Care Plan (routine screening as documented in Health Maintenance) for Samra meets the Care Plan requirement. This Care Plan has been established and reviewed with the Patient.    Counseling Resources:  ATP IV Guidelines  Pooled Cohorts Equation Calculator  Breast Cancer Risk Calculator  Breast Cancer: Medication to Reduce Risk  FRAX Risk Assessment  ICSI Preventive Guidelines  Dietary Guidelines for Americans, 2010  Primary Data's MyPlate  ASA Prophylaxis  Lung CA Screening    Silvia Fregoso NP  [facility]    Identified Health Risks:  "

## 2022-10-07 LAB — HCV AB SERPL QL IA: NONREACTIVE

## 2022-12-17 DIAGNOSIS — R09.02 HYPOXIA: ICD-10-CM

## 2022-12-18 NOTE — TELEPHONE ENCOUNTER
"Routing refill request to provider for review/approval because:  Labs not current:  Asthma control assessment score    Last Written Prescription Date:  9/20/22  Last Fill Quantity: 360 mL,  # refills: 0   Last office visit provider:  10/6/22     Requested Prescriptions   Pending Prescriptions Disp Refills     budesonide (PULMICORT) 0.5 MG/2ML neb solution [Pharmacy Med Name: BUDESONIDE 0.5 MG/2 ML SUSP] 360 mL 0     Sig: INHALE 2 ML (0.5 MG TOTAL) BY NEBULIZATION 2 (TWO) TIMES A DAY.       Inhaled Steroids Protocol Failed - 12/17/2022 12:44 AM        Failed - Asthma control assessment score within normal limits in last 6 months     Please review ACT score.           Passed - Patient is age 12 or older        Passed - Medication is active on med list        Passed - Recent (6 mo) or future (30 days) visit within the authorizing provider's specialty     Patient had office visit in the last 6 months or has a visit in the next 30 days with authorizing provider or within the authorizing provider's specialty.  See \"Patient Info\" tab in inbasket, or \"Choose Columns\" in Meds & Orders section of the refill encounter.                 Mary Buchanan 12/18/22 10:47 AM  "

## 2022-12-22 RX ORDER — BUDESONIDE 0.5 MG/2ML
INHALANT ORAL
Qty: 360 ML | Refills: 0 | Status: SHIPPED | OUTPATIENT
Start: 2022-12-22 | End: 2024-01-05

## 2023-01-23 ENCOUNTER — OFFICE VISIT (OUTPATIENT)
Dept: AUDIOLOGY | Facility: CLINIC | Age: 78
End: 2023-01-23
Attending: NURSE PRACTITIONER
Payer: COMMERCIAL

## 2023-01-23 DIAGNOSIS — H93.13 TINNITUS OF BOTH EARS: ICD-10-CM

## 2023-01-23 DIAGNOSIS — H90.3 SENSORINEURAL HEARING LOSS (SNHL) OF BOTH EARS: ICD-10-CM

## 2023-01-23 DIAGNOSIS — H90.3 SENSORINEURAL HEARING LOSS, BILATERAL: Primary | ICD-10-CM

## 2023-01-23 PROCEDURE — 92550 TYMPANOMETRY & REFLEX THRESH: CPT | Performed by: AUDIOLOGIST

## 2023-01-23 PROCEDURE — 92557 COMPREHENSIVE HEARING TEST: CPT | Performed by: AUDIOLOGIST
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
## 2023-07-14 DIAGNOSIS — Z00.00 WELLNESS EXAMINATION: ICD-10-CM

## 2023-07-14 RX ORDER — ALBUTEROL SULFATE 90 UG/1
AEROSOL, METERED RESPIRATORY (INHALATION)
Refills: 5 | OUTPATIENT
Start: 2023-07-14

## 2023-07-14 NOTE — LETTER
Dear Samra,    We have been trying to reach you in regard to a recent refill request. Your prior PCP has left their practice here, and it does not look like you have Established Care with a new provider yet. You will need to establish care before you need any additional refills. To schedule or with any questions you can reach us at [phone].    Have a great day!    [facility] Nursing Staff

## 2023-07-14 NOTE — TELEPHONE ENCOUNTER
Left message to call back for: Patient  Information to relay to patient: Pt needs to establish care with a provider taking new patients. Once scheduled can route to provider pt scheduled with.     Kristen Dahl CMA.

## 2023-07-14 NOTE — TELEPHONE ENCOUNTER
"Routing refill request to provider for review/approval because:  Labs not current:  ACT  Patient needs to be seen because it has been more than 6 months since last office visit.  Break in medication    Last Written Prescription Date:  1/27/22  Last Fill Quantity: 18 g,  # refills: 5   Last office visit provider:   10/6/22    Requested Prescriptions   Pending Prescriptions Disp Refills    albuterol (PROAIR HFA/PROVENTIL HFA/VENTOLIN HFA) 108 (90 Base) MCG/ACT inhaler [Pharmacy Med Name: ALBUTEROL HFA (PROAIR) INHALER]  5     Sig: INHALE 1-2 PUFFS EVERY 4 HOURS AS NEEDED FOR WHEEZING.       Asthma Maintenance Inhalers - Anticholinergics Failed - 7/14/2023  1:57 PM        Failed - Asthma control assessment score within normal limits in last 6 months     Please review ACT score.           Failed - Recent (6 mo) or future (30 days) visit within the authorizing provider's specialty     Patient had office visit in the last 6 months or has a visit in the next 30 days with authorizing provider or within the authorizing provider's specialty.  See \"Patient Info\" tab in inbasket, or \"Choose Columns\" in Meds & Orders section of the refill encounter.            Passed - Patient is age 12 years or older        Passed - Medication is active on med list       Short-Acting Beta Agonist Inhalers Protocol  Failed - 7/14/2023  1:57 PM        Failed - Asthma control assessment score within normal limits in last 6 months     Please review ACT score.           Failed - Recent (6 mo) or future (30 days) visit within the authorizing provider's specialty     Patient had office visit in the last 6 months or has a visit in the next 30 days with authorizing provider or within the authorizing provider's specialty.  See \"Patient Info\" tab in inbasket, or \"Choose Columns\" in Meds & Orders section of the refill encounter.            Passed - Patient is age 12 or older        Passed - Medication is active on med list             Diana Gutierrez, " RN 07/14/23 1:57 PM

## 2023-07-18 NOTE — TELEPHONE ENCOUNTER
Left message to call back for: Samra  Information to relay to patient: please relay message below    Letter sent

## 2023-09-05 ENCOUNTER — OFFICE VISIT (OUTPATIENT)
Dept: FAMILY MEDICINE | Facility: CLINIC | Age: 78
End: 2023-09-05
Payer: COMMERCIAL

## 2023-09-05 VITALS
HEIGHT: 61 IN | WEIGHT: 159 LBS | DIASTOLIC BLOOD PRESSURE: 66 MMHG | BODY MASS INDEX: 30.02 KG/M2 | TEMPERATURE: 98.1 F | HEART RATE: 73 BPM | SYSTOLIC BLOOD PRESSURE: 124 MMHG | OXYGEN SATURATION: 97 %

## 2023-09-05 DIAGNOSIS — L98.9 SKIN LESION: ICD-10-CM

## 2023-09-05 DIAGNOSIS — E78.01 FAMILIAL HYPERCHOLESTEROLEMIA: ICD-10-CM

## 2023-09-05 DIAGNOSIS — R60.9 EDEMA, UNSPECIFIED TYPE: ICD-10-CM

## 2023-09-05 DIAGNOSIS — I48.91 ATRIAL FIBRILLATION WITH RVR (H): ICD-10-CM

## 2023-09-05 DIAGNOSIS — J44.9 CHRONIC OBSTRUCTIVE PULMONARY DISEASE, UNSPECIFIED COPD TYPE (H): Primary | ICD-10-CM

## 2023-09-05 DIAGNOSIS — E78.2 MIXED HYPERLIPIDEMIA: ICD-10-CM

## 2023-09-05 DIAGNOSIS — I10 ESSENTIAL (PRIMARY) HYPERTENSION: ICD-10-CM

## 2023-09-05 DIAGNOSIS — J44.1 COPD EXACERBATION (H): ICD-10-CM

## 2023-09-05 LAB
ALBUMIN SERPL BCG-MCNC: 4.2 G/DL (ref 3.5–5.2)
ALP SERPL-CCNC: 90 U/L (ref 35–104)
ALT SERPL W P-5'-P-CCNC: 15 U/L (ref 0–50)
ANION GAP SERPL CALCULATED.3IONS-SCNC: 10 MMOL/L (ref 7–15)
AST SERPL W P-5'-P-CCNC: 24 U/L (ref 0–45)
BILIRUB SERPL-MCNC: 0.5 MG/DL
BUN SERPL-MCNC: 16.1 MG/DL (ref 8–23)
CALCIUM SERPL-MCNC: 9.7 MG/DL (ref 8.8–10.2)
CHLORIDE SERPL-SCNC: 102 MMOL/L (ref 98–107)
CHOLEST SERPL-MCNC: 255 MG/DL
CREAT SERPL-MCNC: 0.92 MG/DL (ref 0.51–0.95)
DEPRECATED HCO3 PLAS-SCNC: 29 MMOL/L (ref 22–29)
ERYTHROCYTE [DISTWIDTH] IN BLOOD BY AUTOMATED COUNT: 13.1 % (ref 10–15)
GFR SERPL CREATININE-BSD FRML MDRD: 63 ML/MIN/1.73M2
GLUCOSE SERPL-MCNC: 91 MG/DL (ref 70–99)
HCT VFR BLD AUTO: 43.4 % (ref 35–47)
HDLC SERPL-MCNC: 62 MG/DL
HGB BLD-MCNC: 13.6 G/DL (ref 11.7–15.7)
LDLC SERPL CALC-MCNC: 159 MG/DL
MCH RBC QN AUTO: 27.5 PG (ref 26.5–33)
MCHC RBC AUTO-ENTMCNC: 31.3 G/DL (ref 31.5–36.5)
MCV RBC AUTO: 88 FL (ref 78–100)
NONHDLC SERPL-MCNC: 193 MG/DL
NT-PROBNP SERPL-MCNC: 118 PG/ML (ref 0–1800)
PLATELET # BLD AUTO: 319 10E3/UL (ref 150–450)
POTASSIUM SERPL-SCNC: 4.5 MMOL/L (ref 3.4–5.3)
PROT SERPL-MCNC: 7.2 G/DL (ref 6.4–8.3)
RBC # BLD AUTO: 4.94 10E6/UL (ref 3.8–5.2)
SODIUM SERPL-SCNC: 141 MMOL/L (ref 136–145)
TRIGL SERPL-MCNC: 170 MG/DL
WBC # BLD AUTO: 9.6 10E3/UL (ref 4–11)

## 2023-09-05 PROCEDURE — 85027 COMPLETE CBC AUTOMATED: CPT | Performed by: NURSE PRACTITIONER

## 2023-09-05 PROCEDURE — 99214 OFFICE O/P EST MOD 30 MIN: CPT | Performed by: NURSE PRACTITIONER

## 2023-09-05 PROCEDURE — 83880 ASSAY OF NATRIURETIC PEPTIDE: CPT | Performed by: NURSE PRACTITIONER

## 2023-09-05 PROCEDURE — 80061 LIPID PANEL: CPT | Performed by: NURSE PRACTITIONER

## 2023-09-05 PROCEDURE — 36415 COLL VENOUS BLD VENIPUNCTURE: CPT | Performed by: NURSE PRACTITIONER

## 2023-09-05 PROCEDURE — 80053 COMPREHEN METABOLIC PANEL: CPT | Performed by: NURSE PRACTITIONER

## 2023-09-05 RX ORDER — ALBUTEROL SULFATE 90 UG/1
AEROSOL, METERED RESPIRATORY (INHALATION)
Qty: 18 G | Refills: 5 | Status: SHIPPED | OUTPATIENT
Start: 2023-09-05

## 2023-09-05 ASSESSMENT — PAIN SCALES - GENERAL: PAINLEVEL: NO PAIN (0)

## 2023-09-05 ASSESSMENT — PATIENT HEALTH QUESTIONNAIRE - PHQ9
SUM OF ALL RESPONSES TO PHQ QUESTIONS 1-9: 8
10. IF YOU CHECKED OFF ANY PROBLEMS, HOW DIFFICULT HAVE THESE PROBLEMS MADE IT FOR YOU TO DO YOUR WORK, TAKE CARE OF THINGS AT HOME, OR GET ALONG WITH OTHER PEOPLE: SOMEWHAT DIFFICULT
SUM OF ALL RESPONSES TO PHQ QUESTIONS 1-9: 8

## 2023-09-05 NOTE — PROGRESS NOTES
Stevenson Cabrales is a 78 year old, presenting for the following health issues:  Establish Care and Recheck Medication        9/5/2023    11:15 AM   Additional Questions   Roomed by Corazon MARIE CMA       History of Present Illness       Reason for visit:  Obtain ok for refill    She eats 2-3 servings of fruits and vegetables daily.She consumes 1 sweetened beverage(s) daily.She exercises with enough effort to increase her heart rate 9 or less minutes per day.  She exercises with enough effort to increase her heart rate 3 or less days per week.   She is taking medications regularly.       Hypertension Follow-up    Do you check your blood pressure regularly outside of the clinic? No   Are you following a low salt diet? Yes  Are your blood pressures ever more than 140 on the top number (systolic) OR more   than 90 on the bottom number (diastolic), for example 140/90? No    Atrial Fibrillation Follow-up    Symptoms: no recent chest pain, significant palpitations, dizziness/lightheadedness, dyspnea, or increased peripheral edema.  Stroke prevention: DOAC (Eliquis, Xarelto, Pradaxa)        1/13/2022     3:53 PM 3/31/2022     3:24 PM 8/25/2022    12:59 PM 10/6/2022    10:03 AM 9/5/2023    11:20 AM   Date   Pulse 72 60 71 65 73     Current [id number] Score: 4 points, which represents a 4.8% annual risk of major embolic event, without anti-coagulation or an LAAO device.       COPD Follow-Up  Overall, how are your COPD symptoms since your last clinic visit?  No change  How much fatigue or shortness of breath do you have when you are walking?  Same as usual  How much shortness of breath do you have when you are resting?  Same as usual  How often do you cough? Sometimes  Have you noticed any change in your sputum/phlegm?  No  Have you experienced a recent fever? No  Please describe how far you can walk without stopping to rest:  Less than 1 block  How many flights of stairs are you able to walk up without stopping?  1  Have you  "had any Emergency Room Visits, Urgent Care Visits, or Hospital Admissions because of your COPD since your last office visit?  No    History   Smoking Status    Former    Packs/day: 3.00    Years: 29.00    Types: Cigarettes    Quit date: 1/1/1979   Smokeless Tobacco    Never     Review of Systems   Constitutional, HEENT, cardiovascular, pulmonary, GI, , musculoskeletal, neuro, skin, endocrine and psych systems are negative, except as otherwise noted.    Breathing stable.  Changing skin lesion on right forearm- red, inflamed.     Objective    /66 (BP Location: Left arm, Patient Position: Sitting, Cuff Size: Adult Regular)   Pulse 73   Temp 98.1  F (36.7  C) (Oral)   Ht 1.549 m (5' 1\")   Wt 72.1 kg (159 lb)   SpO2 97%   BMI 30.04 kg/m    Body mass index is 30.04 kg/m .  Physical Exam   GENERAL: healthy, alert and no distress  EYES: Eyes grossly normal to inspection, PERRL and conjunctivae and sclerae normal  HENT: ear canals and TM's normal, nose and mouth without ulcers or lesions  NECK: no adenopathy, no asymmetry, masses, or scars and thyroid normal to palpation  RESP: faint expiratory wheezing   BREAST: normal without masses, tenderness or nipple discharge and no palpable axillary masses or adenopathy  CV: regular rate and rhythm, normal S1 S2, no S3 or S4, no murmur, click or rub, no peripheral edema and peripheral pulses strong  ABDOMEN: soft, nontender, no hepatosplenomegaly, no masses and bowel sounds normal  MS: no gross musculoskeletal defects noted, no edema  SKIN: right forearm- dark, irregular lesion with underlying erythema on right half   NEURO: Normal strength and tone, mentation intact and speech normal  PSYCH: mentation appears normal, affect normal/bright          A/P:  (J44.9) Chronic obstructive pulmonary disease, unspecified COPD type (H)  (primary encounter diagnosis)  Comment: Stable  Plan: albuterol (PROAIR HFA/PROVENTIL HFA/VENTOLIN         HFA) 108 (90 Base) MCG/ACT inhaler      "       (I48.91) Atrial fibrillation with RVR (H)  Comment: Managed per cardiology  Plan: CBC with platelets, Comprehensive metabolic         panel (BMP + Alb, Alk Phos, ALT, AST, Total.         Bili, TP)            (I10) Essential (primary) hypertension  Comment: Stable  Plan: CBC with platelets, Comprehensive metabolic         panel (BMP + Alb, Alk Phos, ALT, AST, Total.         Bili, TP)            (E78.01) Familial hypercholesterolemia  Comment: Stable  Plan: Lipid panel reflex to direct LDL Fasting,         Comprehensive metabolic panel (BMP + Alb, Alk         Phos, ALT, AST, Total. Bili, TP)            (L98.9) Skin lesion  Comment:   Plan: Adult Dermatology Referral            (R60.9) Edema, unspecified type  Comment:   Plan: BNP-N terminal pro

## 2023-09-06 PROBLEM — E78.2 MIXED HYPERLIPIDEMIA: Status: ACTIVE | Noted: 2023-09-06

## 2023-09-06 RX ORDER — ROSUVASTATIN CALCIUM 20 MG/1
20 TABLET, COATED ORAL DAILY
Qty: 90 TABLET | Refills: 1 | Status: SHIPPED | OUTPATIENT
Start: 2023-09-06 | End: 2023-12-01

## 2023-10-03 ENCOUNTER — TELEPHONE (OUTPATIENT)
Dept: CARDIOLOGY | Facility: CLINIC | Age: 78
End: 2023-10-03
Payer: COMMERCIAL

## 2023-10-03 DIAGNOSIS — I48.0 PAROXYSMAL ATRIAL FIBRILLATION (H): ICD-10-CM

## 2023-10-03 NOTE — TELEPHONE ENCOUNTER
----- Message from Demetria Garcia V sent at 10/3/2023  2:09 PM CDT -----  General phone call:    Caller: Samra  Primary cardiologist: Tonya  Detailed reason for call: Pt needs authorization for Xarelto Refill, she only has 3 pills left.  She has an annual follow-up w/ Dr. Castaneda scheduled on 12/1/2023  New or active symptoms? no  Best phone number: [phone]  Best time to contact: anytime  Ok to leave a detailedmessage? yes  Device? no    Additional Info:

## 2023-11-11 DIAGNOSIS — I10 ESSENTIAL HYPERTENSION: ICD-10-CM

## 2023-11-11 DIAGNOSIS — I48.0 PAROXYSMAL ATRIAL FIBRILLATION (H): ICD-10-CM

## 2023-11-14 RX ORDER — DILTIAZEM HYDROCHLORIDE 180 MG/1
180 CAPSULE, COATED, EXTENDED RELEASE ORAL DAILY
Qty: 90 CAPSULE | Refills: 1 | Status: SHIPPED | OUTPATIENT
Start: 2023-11-14 | End: 2023-12-01

## 2023-11-17 DIAGNOSIS — I48.0 PAROXYSMAL ATRIAL FIBRILLATION (H): ICD-10-CM

## 2023-11-17 RX ORDER — FLECAINIDE ACETATE 100 MG/1
TABLET ORAL
Qty: 180 TABLET | Refills: 0 | Status: SHIPPED | OUTPATIENT
Start: 2023-11-17 | End: 2023-12-01

## 2023-12-01 ENCOUNTER — OFFICE VISIT (OUTPATIENT)
Dept: CARDIOLOGY | Facility: CLINIC | Age: 78
End: 2023-12-01
Payer: COMMERCIAL

## 2023-12-01 VITALS
SYSTOLIC BLOOD PRESSURE: 134 MMHG | BODY MASS INDEX: 30.47 KG/M2 | HEIGHT: 61 IN | OXYGEN SATURATION: 93 % | RESPIRATION RATE: 16 BRPM | WEIGHT: 161.4 LBS | HEART RATE: 75 BPM | DIASTOLIC BLOOD PRESSURE: 68 MMHG

## 2023-12-01 DIAGNOSIS — I20.89 ANGINAL EQUIVALENT (H): ICD-10-CM

## 2023-12-01 DIAGNOSIS — I48.0 PAROXYSMAL ATRIAL FIBRILLATION (H): Primary | ICD-10-CM

## 2023-12-01 DIAGNOSIS — I10 ESSENTIAL HYPERTENSION: ICD-10-CM

## 2023-12-01 DIAGNOSIS — E78.5 HYPERLIPIDEMIA LDL GOAL <70: ICD-10-CM

## 2023-12-01 DIAGNOSIS — R06.09 DOE (DYSPNEA ON EXERTION): ICD-10-CM

## 2023-12-01 DIAGNOSIS — J44.9 COPD MIXED TYPE (H): ICD-10-CM

## 2023-12-01 PROCEDURE — 99214 OFFICE O/P EST MOD 30 MIN: CPT | Performed by: INTERNAL MEDICINE

## 2023-12-01 RX ORDER — DILTIAZEM HYDROCHLORIDE 180 MG/1
180 CAPSULE, COATED, EXTENDED RELEASE ORAL DAILY
Qty: 90 CAPSULE | Refills: 3 | Status: SHIPPED | OUTPATIENT
Start: 2023-12-01

## 2023-12-01 RX ORDER — FLECAINIDE ACETATE 100 MG/1
TABLET ORAL
Qty: 180 TABLET | Refills: 3 | Status: SHIPPED | OUTPATIENT
Start: 2023-12-01

## 2023-12-01 RX ORDER — ROSUVASTATIN CALCIUM 5 MG/1
5 TABLET, COATED ORAL DAILY
Qty: 30 TABLET | Refills: 11 | Status: SHIPPED | OUTPATIENT
Start: 2023-12-01 | End: 2024-08-02
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
## 2023-12-01 NOTE — PATIENT INSTRUCTIONS
It was a pleasure to meet with you today.      Below is a summary of your visit.   Start taking rosuvastatin initially at 5 mg once per week. If you tolerate a couple doses of this, then gradually increase how frequently you take this to reach a goal of taking it daily.  You can consider moving your diltiazem dosing to evening.  Continue your other medications without changes  Schedule a CT coronary angiogram to evaluate for coronary artery disease  Schedule appointments for pulmonary function tests (PFTs) and a consult in pulmonary medicine to review your COPD diagnosis.    I recommend evaluation in a sleep clinic for sleep apnea. Two independent sleep clinics in the [facility] you could consider seeing are:    [facility]: Multiple location in [facility]  [address]  [address]  [place] [address] [phone]  [email or web address]    [facility]:  [place] [facility] [place]  [phone]  [email or web address]    Please let me know if either of these clinics is requesting a direct referral and I would be happy to provide one.    Follow up with me in a year or sooner if needed.       Please do not hesitate to call the [email or web address] with any questions or concerns at [phone].     Sincerely,
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
## 2023-12-26 ENCOUNTER — HOSPITAL ENCOUNTER (OUTPATIENT)
Dept: CT IMAGING | Facility: CLINIC | Age: 78
Discharge: HOME | End: 2023-12-26
Attending: INTERNAL MEDICINE | Admitting: INTERNAL MEDICINE
Payer: COMMERCIAL

## 2023-12-26 VITALS — DIASTOLIC BLOOD PRESSURE: 57 MMHG | SYSTOLIC BLOOD PRESSURE: 122 MMHG

## 2023-12-26 DIAGNOSIS — R93.1 ABNORMAL FINDINGS ON DIAGNOSTIC IMAGING OF HEART AND CORONARY CIRCULATION: ICD-10-CM

## 2023-12-26 DIAGNOSIS — R06.09 DOE (DYSPNEA ON EXERTION): ICD-10-CM

## 2023-12-26 DIAGNOSIS — I20.89 ANGINAL EQUIVALENT (H): ICD-10-CM

## 2023-12-26 LAB
CREAT BLD-MCNC: 1 MG/DL (ref 0.6–1.1)
EGFRCR SERPLBLD CKD-EPI 2021: 57 ML/MIN/1.73M2

## 2023-12-26 PROCEDURE — 250N000011 HC RX IP 250 OP 636: Performed by: INTERNAL MEDICINE

## 2023-12-26 PROCEDURE — 250N000009 HC RX 250: Performed by: INTERNAL MEDICINE

## 2023-12-26 PROCEDURE — 0503T CT FFR: CPT

## 2023-12-26 PROCEDURE — 75574 CT ANGIO HRT W/3D IMAGE: CPT | Mod: 26 | Performed by: INTERNAL MEDICINE

## 2023-12-26 PROCEDURE — 75574 CT ANGIO HRT W/3D IMAGE: CPT

## 2023-12-26 PROCEDURE — 250N000013 HC RX MED GY IP 250 OP 250 PS 637: Performed by: INTERNAL MEDICINE

## 2023-12-26 PROCEDURE — 0504T CT FFR: CPT | Performed by: INTERNAL MEDICINE

## 2023-12-26 PROCEDURE — 82565 ASSAY OF CREATININE: CPT

## 2023-12-26 RX ORDER — IOPAMIDOL 755 MG/ML
100 INJECTION, SOLUTION INTRAVASCULAR ONCE
Status: DISCONTINUED | OUTPATIENT
Start: 2023-12-26 | End: 2023-12-26 | Stop reason: DRUGHIGH

## 2023-12-26 RX ORDER — METOPROLOL TARTRATE 1 MG/ML
5 INJECTION, SOLUTION INTRAVENOUS
Status: DISCONTINUED | OUTPATIENT
Start: 2023-12-26 | End: 2023-12-27 | Stop reason: HOSPADM

## 2023-12-26 RX ORDER — LIDOCAINE 40 MG/G
CREAM TOPICAL
Status: DISCONTINUED | OUTPATIENT
Start: 2023-12-26 | End: 2023-12-27 | Stop reason: HOSPADM

## 2023-12-26 RX ORDER — IOPAMIDOL 755 MG/ML
75 INJECTION, SOLUTION INTRAVASCULAR ONCE
Status: COMPLETED | OUTPATIENT
Start: 2023-12-26 | End: 2023-12-26

## 2023-12-26 RX ORDER — DILTIAZEM HYDROCHLORIDE 5 MG/ML
5 INJECTION INTRAVENOUS
Status: DISCONTINUED | OUTPATIENT
Start: 2023-12-26 | End: 2023-12-27 | Stop reason: HOSPADM

## 2023-12-26 RX ORDER — NITROGLYCERIN 0.4 MG/1
0.4 TABLET SUBLINGUAL ONCE
Status: COMPLETED | OUTPATIENT
Start: 2023-12-26 | End: 2023-12-26

## 2023-12-26 RX ORDER — DILTIAZEM HYDROCHLORIDE 5 MG/ML
10 INJECTION INTRAVENOUS
Status: DISCONTINUED | OUTPATIENT
Start: 2023-12-26 | End: 2023-12-27 | Stop reason: HOSPADM

## 2023-12-26 RX ADMIN — IOPAMIDOL 75 ML: 755 INJECTION, SOLUTION INTRAVENOUS at 10:17

## 2023-12-26 RX ADMIN — METOPROLOL TARTRATE 5 MG: 1 INJECTION, SOLUTION INTRAVENOUS at 10:13

## 2023-12-26 RX ADMIN — NITROGLYCERIN 0.4 MG: 0.4 TABLET SUBLINGUAL at 10:07

## 2023-12-27 LAB
BSA FOR ECHO PROCEDURE: 2 M2
BSA FOR ECHO PROCEDURE: 2 M2
CV CALCIUM SCORE AGATSTON LM: 0
CV CALCIUM SCORING AGATSON LAD: 10
CV CALCIUM SCORING AGATSTON CX: 2
CV CALCIUM SCORING AGATSTON RCA: 68
CV CALCIUM SCORING AGATSTON TOTAL: 80

## 2023-12-30 ENCOUNTER — HEALTH MAINTENANCE LETTER (OUTPATIENT)
Age: 78
End: 2023-12-30

## 2024-01-05 ENCOUNTER — OFFICE VISIT (OUTPATIENT)
Dept: PULMONOLOGY | Facility: CLINIC | Age: 79
End: 2024-01-05
Payer: COMMERCIAL

## 2024-01-05 VITALS
BODY MASS INDEX: 30.78 KG/M2 | WEIGHT: 163 LBS | OXYGEN SATURATION: 93 % | HEART RATE: 60 BPM | SYSTOLIC BLOOD PRESSURE: 122 MMHG | DIASTOLIC BLOOD PRESSURE: 60 MMHG | HEIGHT: 61 IN

## 2024-01-05 DIAGNOSIS — J44.9 COPD MIXED TYPE (H): ICD-10-CM

## 2024-01-05 DIAGNOSIS — R01.1 HEART MURMUR: ICD-10-CM

## 2024-01-05 DIAGNOSIS — J44.9 COPD, GROUP B, BY GOLD 2017 CLASSIFICATION (H): Primary | ICD-10-CM

## 2024-01-05 LAB — HGB BLD-MCNC: 12.3 G/DL

## 2024-01-05 PROCEDURE — 94729 DIFFUSING CAPACITY: CPT | Performed by: INTERNAL MEDICINE

## 2024-01-05 PROCEDURE — 94060 EVALUATION OF WHEEZING: CPT | Performed by: INTERNAL MEDICINE

## 2024-01-05 PROCEDURE — 85018 HEMOGLOBIN: CPT | Mod: QW | Performed by: INTERNAL MEDICINE

## 2024-01-05 PROCEDURE — 99204 OFFICE O/P NEW MOD 45 MIN: CPT | Performed by: INTERNAL MEDICINE

## 2024-01-05 PROCEDURE — 94726 PLETHYSMOGRAPHY LUNG VOLUMES: CPT | Performed by: INTERNAL MEDICINE

## 2024-01-05 RX ORDER — FLUTICASONE FUROATE, UMECLIDINIUM BROMIDE AND VILANTEROL TRIFENATATE 200; 62.5; 25 UG/1; UG/1; UG/1
1 POWDER RESPIRATORY (INHALATION) DAILY
Qty: 60 EACH | Refills: 6 | Status: SHIPPED | OUTPATIENT
Start: 2024-01-05 | End: 2024-09-11
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
## 2024-01-06 LAB
DLCOCOR-%PRED-PRE: 83 %
DLCOCOR-PRE: 14.2 ML/MIN/MMHG
DLCOUNC-%PRED-PRE: 80 %
DLCOUNC-PRE: 13.69 ML/MIN/MMHG
DLCOUNC-PRED: 16.95 ML/MIN/MMHG
ERV-%PRED-PRE: 20 %
ERV-PRE: 0.15 L
ERV-PRED: 0.75 L
EXPTIME-PRE: 6.69 SEC
FEF2575-%PRED-POST: 83 %
FEF2575-%PRED-PRE: 56 %
FEF2575-POST: 1.15 L/SEC
FEF2575-PRE: 0.78 L/SEC
FEF2575-PRED: 1.38 L/SEC
FEFMAX-%PRED-PRE: 94 %
FEFMAX-PRE: 4.12 L/SEC
FEFMAX-PRED: 4.36 L/SEC
FEV1-%PRED-PRE: 70 %
FEV1-PRE: 1.16 L
FEV1FEV6-PRE: 72 %
FEV1FEV6-PRED: 78 %
FEV1FVC-PRE: 72 %
FEV1FVC-PRED: 78 %
FEV1SVC-PRE: 59 %
FEV1SVC-PRED: 62 %
FIFMAX-PRE: 3.78 L/SEC
FRCPLETH-%PRED-PRE: 128 %
FRCPLETH-PRE: 3.27 L
FRCPLETH-PRED: 2.55 L
FVC-%PRED-PRE: 76 %
FVC-PRE: 1.6 L
FVC-PRED: 2.1 L
IC-%PRED-PRE: 120 %
IC-PRE: 1.79 L
IC-PRED: 1.49 L
RVPLETH-%PRED-PRE: 149 %
RVPLETH-PRE: 3.11 L
RVPLETH-PRED: 2.08 L
TLCPLETH-%PRED-PRE: 114 %
TLCPLETH-PRE: 5.06 L
TLCPLETH-PRED: 4.44 L
VA-%PRED-PRE: 77 %
VA-PRE: 3.1 L
VC-%PRED-PRE: 73 %
VC-PRE: 1.95 L
VC-PRED: 2.64 L

## 2024-01-15 ENCOUNTER — HOSPITAL ENCOUNTER (OUTPATIENT)
Dept: CARDIOLOGY | Facility: CLINIC | Age: 79
Discharge: HOME | End: 2024-01-15
Attending: INTERNAL MEDICINE | Admitting: INTERNAL MEDICINE
Payer: COMMERCIAL

## 2024-01-15 DIAGNOSIS — R01.1 HEART MURMUR: ICD-10-CM

## 2024-01-15 PROCEDURE — 93306 TTE W/DOPPLER COMPLETE: CPT | Mod: 26 | Performed by: INTERNAL MEDICINE

## 2024-01-15 PROCEDURE — 93306 TTE W/DOPPLER COMPLETE: CPT

## 2024-01-15 NOTE — RESULT ENCOUNTER NOTE
Informed Samra of largely unremarkable echocardiogram results via [employer]. Mild aortic stenosis seen.  Offered cardiology referral. Awaiting reply.     Antonio (Sean) MD Mona  [facility] Pulmonary & Critical Care ([facility])  Clinic [phone]  Fax [phone]

## 2024-02-01 ENCOUNTER — OFFICE VISIT (OUTPATIENT)
Dept: PULMONOLOGY | Facility: CLINIC | Age: 79
End: 2024-02-01
Attending: INTERNAL MEDICINE
Payer: COMMERCIAL

## 2024-02-01 VITALS
SYSTOLIC BLOOD PRESSURE: 126 MMHG | DIASTOLIC BLOOD PRESSURE: 64 MMHG | BODY MASS INDEX: 30.23 KG/M2 | OXYGEN SATURATION: 94 % | HEART RATE: 82 BPM | WEIGHT: 160 LBS

## 2024-02-01 DIAGNOSIS — J45.909 UNCOMPLICATED ASTHMA, UNSPECIFIED ASTHMA SEVERITY, UNSPECIFIED WHETHER PERSISTENT: ICD-10-CM

## 2024-02-01 DIAGNOSIS — R01.1 HEART MURMUR: Primary | ICD-10-CM

## 2024-02-01 PROCEDURE — G0009 ADMIN PNEUMOCOCCAL VACCINE: HCPCS | Performed by: INTERNAL MEDICINE

## 2024-02-01 PROCEDURE — 99214 OFFICE O/P EST MOD 30 MIN: CPT | Mod: 25 | Performed by: INTERNAL MEDICINE

## 2024-02-01 PROCEDURE — 90677 PCV20 VACCINE IM: CPT | Performed by: INTERNAL MEDICINE
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
## 2024-08-02 ENCOUNTER — OFFICE VISIT (OUTPATIENT)
Dept: PULMONOLOGY | Facility: CLINIC | Age: 79
End: 2024-08-02
Attending: INTERNAL MEDICINE
Payer: COMMERCIAL

## 2024-08-02 VITALS
HEART RATE: 68 BPM | SYSTOLIC BLOOD PRESSURE: 114 MMHG | BODY MASS INDEX: 29.48 KG/M2 | DIASTOLIC BLOOD PRESSURE: 70 MMHG | WEIGHT: 156 LBS | OXYGEN SATURATION: 94 %

## 2024-08-02 DIAGNOSIS — J45.909 UNCOMPLICATED ASTHMA, UNSPECIFIED ASTHMA SEVERITY, UNSPECIFIED WHETHER PERSISTENT: Primary | ICD-10-CM

## 2024-08-02 DIAGNOSIS — J44.9 COPD MIXED TYPE (H): ICD-10-CM

## 2024-08-02 PROCEDURE — 99213 OFFICE O/P EST LOW 20 MIN: CPT | Performed by: INTERNAL MEDICINE

## 2024-08-02 PROCEDURE — G2211 COMPLEX E/M VISIT ADD ON: HCPCS | Performed by: INTERNAL MEDICINE

## 2024-08-02 ASSESSMENT — ASTHMA QUESTIONNAIRES
QUESTION_5 LAST FOUR WEEKS HOW WOULD YOU RATE YOUR ASTHMA CONTROL: WELL CONTROLLED
QUESTION_1 LAST FOUR WEEKS HOW MUCH OF THE TIME DID YOUR ASTHMA KEEP YOU FROM GETTING AS MUCH DONE AT WORK, SCHOOL OR AT HOME: NONE OF THE TIME
ACT_TOTALSCORE: 21
QUESTION_4 LAST FOUR WEEKS HOW OFTEN HAVE YOU USED YOUR RESCUE INHALER OR NEBULIZER MEDICATION (SUCH AS ALBUTEROL): NOT AT ALL
ACT_TOTALSCORE: 21
QUESTION_2 LAST FOUR WEEKS HOW OFTEN HAVE YOU HAD SHORTNESS OF BREATH: ONCE A DAY
QUESTION_3 LAST FOUR WEEKS HOW OFTEN DID YOUR ASTHMA SYMPTOMS (WHEEZING, COUGHING, SHORTNESS OF BREATH, CHEST TIGHTNESS OR PAIN) WAKE YOU UP AT NIGHT OR EARLIER THAN USUAL IN THE MORNING: NOT AT ALL
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
## 2024-09-11 ENCOUNTER — TELEPHONE (OUTPATIENT)
Dept: PULMONOLOGY | Facility: CLINIC | Age: 79
End: 2024-09-11
Payer: COMMERCIAL

## 2024-09-11 DIAGNOSIS — J44.9 COPD, GROUP B, BY GOLD 2017 CLASSIFICATION (H): ICD-10-CM

## 2024-09-11 RX ORDER — FLUTICASONE FUROATE, UMECLIDINIUM BROMIDE AND VILANTEROL TRIFENATATE 200; 62.5; 25 UG/1; UG/1; UG/1
1 POWDER RESPIRATORY (INHALATION) DAILY
Qty: 60 EACH | Refills: 6 | Status: SHIPPED | OUTPATIENT
Start: 2024-09-11

## 2024-09-11 NOTE — TELEPHONE ENCOUNTER
[facility] Call Center    Phone Message    May a detailed message be left on voicemail: yes     Reason for Call: Medication Refill Request    Has the patient contacted the pharmacy for the refill? Yes   Name of medication being requested: Fluticasone-Umeclidin-Vilanterol (TRELEGY ELLIPTA) 200-62.5-25 MCG/ACT oral inhaler  Provider who prescribed the medication: Dr Dunn  Pharmacy: [facility] [id number] [address]   Date medication is needed: 9/11- Pt has 4 puffs left.     Action Taken: Message routed to:  Other: [facility]     Travel Screening: Not Applicable     Date of Service: 9/11

## 2024-11-13 ENCOUNTER — TELEPHONE (OUTPATIENT)
Dept: ANTICOAGULATION | Facility: CLINIC | Age: 79
End: 2024-11-13
Payer: COMMERCIAL

## 2024-11-13 NOTE — TELEPHONE ENCOUNTER
ANTICOAGULATION DIRECT ORAL ANTICOAGULANT MONITORING    SUBJECTIVE     The [facility] is evaluating Samra Gay's Rivaroxaban (Xarelto) as part of its Anticoagulation Monitoring Program.    Indication:Atrial Fibrillation  Current dose per medication list: Rivaroxaban 15 mg Daily  Recent hospitalizations/ED/Office Visits for bleeding/clotting concerns: No  Other bleeding or side effect concerns: No  Additional findings: none    OBJECTIVE     Age: 79 year old    Wt Readings from Last 2 Encounters:   08/02/24 70.8 kg (156 lb)   02/01/24 72.6 kg (160 lb)      Lab Results   Component Value Date    CR 1.0 (iSTAT) 12/26/2023    CR 0.92 (venous) 09/05/2023    CR 0.84 (venous) 10/06/2022     Creatinine Clearance (using actual bodyweight, mL/min):   50 (based on iSTAT result)  54 (based on most recent venous creatinine)    Lab Results   Component Value Date    HGB 12.3 01/05/2024    HGB 13.6 09/05/2023     09/05/2023     ASSESSMENT/PLAN     A chart review for Direct Oral Anticoagulant (DOAC) Stewardship has been completed for:     Dosing: dose appropriate for renal function    Plan made per [facility] anticoagulation protocol    Mendy Rangel, RN  Anticoagulation Clinic

## 2024-12-30 ENCOUNTER — TELEPHONE (OUTPATIENT)
Dept: CARDIOLOGY | Facility: CLINIC | Age: 79
End: 2024-12-30
Payer: COMMERCIAL

## 2024-12-30 DIAGNOSIS — I48.0 PAROXYSMAL ATRIAL FIBRILLATION (H): ICD-10-CM

## 2024-12-30 NOTE — TELEPHONE ENCOUNTER
----- Message from Demetria SAUCEDO sent at 12/30/2024  3:55 PM CST -----  Regarding: TIME SENSITIVE PT COMPLETELY OUT OF XARELTO  Patient has already contacted their pharmacy. The medication or refill issue is below:    Ordering Cardiologist: ren  Medication: xarelto  Issue / Concern: completely out of medicaiton needs refill., has appt on 1/30 w/ Dr. mcclure  Preferred Pharmacy/City:[facility]  Best Phone Number for Patient: [phone]    Additional Info:

## 2025-01-30 ENCOUNTER — OFFICE VISIT (OUTPATIENT)
Dept: CARDIOLOGY | Facility: CLINIC | Age: 80
End: 2025-01-30
Payer: COMMERCIAL

## 2025-01-30 VITALS
HEART RATE: 65 BPM | WEIGHT: 155 LBS | OXYGEN SATURATION: 96 % | DIASTOLIC BLOOD PRESSURE: 81 MMHG | BODY MASS INDEX: 29.29 KG/M2 | SYSTOLIC BLOOD PRESSURE: 145 MMHG

## 2025-01-30 DIAGNOSIS — I10 ESSENTIAL HYPERTENSION: ICD-10-CM

## 2025-01-30 DIAGNOSIS — I48.0 PAROXYSMAL ATRIAL FIBRILLATION (H): ICD-10-CM

## 2025-01-30 DIAGNOSIS — I25.10 CORONARY ARTERY DISEASE INVOLVING NATIVE CORONARY ARTERY OF NATIVE HEART WITHOUT ANGINA PECTORIS: Primary | ICD-10-CM

## 2025-01-30 DIAGNOSIS — J45.40 MODERATE PERSISTENT ASTHMA WITHOUT COMPLICATION: ICD-10-CM

## 2025-01-30 PROBLEM — J44.1 COPD EXACERBATION (H): Status: RESOLVED | Noted: 2019-01-15 | Resolved: 2025-01-30

## 2025-01-30 RX ORDER — FLECAINIDE ACETATE 100 MG/1
TABLET ORAL
Qty: 180 TABLET | Refills: 3 | Status: SHIPPED | OUTPATIENT
Start: 2025-01-30

## 2025-01-30 RX ORDER — DILTIAZEM HYDROCHLORIDE 180 MG/1
180 CAPSULE, COATED, EXTENDED RELEASE ORAL DAILY
Qty: 90 CAPSULE | Refills: 3 | Status: SHIPPED | OUTPATIENT
Start: 2025-01-30
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
## 2025-01-30 NOTE — PATIENT INSTRUCTIONS
It was a pleasure to meet with you today.      Below is a summary of your visit.   Continue your medications without changes.  Consider having a left atrial appendage closure procedure performed. This is a minor surgery that will allow you to discontinue your blood thinner and still protect you from stroke related to atrial fibrillation. You can find more information at [email or web address]. you could also consider an ablation procedure to eliminate your need for flecainide. These procedures can be performed separately, or combined into one procedure.  Follow up in a year.         Please do not hesitate to call the [facility] with any questions or concerns at [phone]. You can also reach my nurse, Shama, at [phone].    Sincerely,

## 2025-04-29 ENCOUNTER — TRANSFERRED RECORDS (OUTPATIENT)
Dept: HEALTH INFORMATION MANAGEMENT | Facility: CLINIC | Age: 80
End: 2025-04-29

## 2025-05-29 ENCOUNTER — TELEPHONE (OUTPATIENT)
Dept: CARDIOLOGY | Facility: CLINIC | Age: 80
End: 2025-05-29
Payer: COMMERCIAL

## 2025-05-29 NOTE — TELEPHONE ENCOUNTER
----- Message from Yazmin Wolf sent at 5/29/2025  3:21 PM CDT -----  Regarding: ELGIN PATIENT  General phone call:    Caller: DR[facility]    Primary cardiologist: ELGIN    Detailed reason for call: PLEASE CALL RE: RECOMMENDATIONS PRIOR TO TEETH EXTRACTIONS IN JULY     Best phone number: [phone]    Best time to contact: ANY    Ok to leave a detailedmessage? YES    Device? NO    Additional Info:

## 2025-05-29 NOTE — TELEPHONE ENCOUNTER
Called and left message asking for more information or if the message is inquiring about patient's hold time and when to restart Xarelto.-yeimi

## 2025-06-04 NOTE — TELEPHONE ENCOUNTER
Spoke with patient, she explained she has tooth extraction planned for July 2nd with local anesthetic. Explained will touch base with  regarding hold time and when to restart Xarelto with procedure.-yeimi

## 2025-06-04 NOTE — TELEPHONE ENCOUNTER
Called patient-- unable to reach. Left message for patient to return call.  Called [facility] and requested call back with detailed message of planned procedure as well as if inquiring regarding how long to hold Xarelto prior to procedure and when to restart.-yeimi

## 2025-06-04 NOTE — TELEPHONE ENCOUNTER
Spoke with Jeannie, Resident at [facility] and updated her of 's recommendations. Jeannie verbalized understanding and noted will update patient with the instructions.-yeimi  _____________  ----- Message -----  From: Marquise Castaneda MD  Sent: 6/4/2025   3:29 PM CDT  To: Marita Moore, DIDIER    Can stop xarelto 2 days prior to her surgery. Resume the day after surgery.  ELGIN  ----- Message -----  From: Marita Moore RN  Sent: 6/4/2025  12:12 PM CDT  To: Marquise Castaneda MD    ----- Message from Marita Moore RN sent at 6/4/2025 12:12 PM CDT -----    May you advise regarding how long to hold Xarelto prior to teeth extractions and when to restart? Planned 7/2 under local anesthesia at [facility].   Thank you.  -yeimi

## 2025-06-10 ENCOUNTER — OFFICE VISIT (OUTPATIENT)
Dept: FAMILY MEDICINE | Facility: CLINIC | Age: 80
End: 2025-06-10
Payer: COMMERCIAL

## 2025-06-10 ENCOUNTER — ANCILLARY PROCEDURE (OUTPATIENT)
Dept: GENERAL RADIOLOGY | Facility: CLINIC | Age: 80
End: 2025-06-10
Attending: NURSE PRACTITIONER
Payer: COMMERCIAL

## 2025-06-10 VITALS
WEIGHT: 162.9 LBS | OXYGEN SATURATION: 92 % | RESPIRATION RATE: 16 BRPM | HEART RATE: 79 BPM | TEMPERATURE: 98.6 F | HEIGHT: 61 IN | BODY MASS INDEX: 30.76 KG/M2 | SYSTOLIC BLOOD PRESSURE: 138 MMHG | DIASTOLIC BLOOD PRESSURE: 64 MMHG

## 2025-06-10 DIAGNOSIS — R01.1 HEART MURMUR: ICD-10-CM

## 2025-06-10 DIAGNOSIS — E55.9 VITAMIN D DEFICIENCY, UNSPECIFIED: ICD-10-CM

## 2025-06-10 DIAGNOSIS — R53.83 FATIGUE, UNSPECIFIED TYPE: ICD-10-CM

## 2025-06-10 DIAGNOSIS — I10 ESSENTIAL (PRIMARY) HYPERTENSION: ICD-10-CM

## 2025-06-10 DIAGNOSIS — J44.9 CHRONIC OBSTRUCTIVE PULMONARY DISEASE, UNSPECIFIED COPD TYPE (H): ICD-10-CM

## 2025-06-10 DIAGNOSIS — R22.1 NECK SWELLING: ICD-10-CM

## 2025-06-10 DIAGNOSIS — R73.09 ELEVATED GLUCOSE: ICD-10-CM

## 2025-06-10 DIAGNOSIS — Z13.6 SCREENING FOR CARDIOVASCULAR CONDITION: ICD-10-CM

## 2025-06-10 DIAGNOSIS — K04.7 ABSCESSED TOOTH: Primary | ICD-10-CM

## 2025-06-10 DIAGNOSIS — E78.2 MIXED HYPERLIPIDEMIA: ICD-10-CM

## 2025-06-10 LAB
ALT SERPL W P-5'-P-CCNC: 19 U/L (ref 0–50)
ANION GAP SERPL CALCULATED.3IONS-SCNC: 12 MMOL/L (ref 7–15)
BASOPHILS # BLD AUTO: 0 10E3/UL (ref 0–0.2)
BASOPHILS NFR BLD AUTO: 0 %
BUN SERPL-MCNC: 12.3 MG/DL (ref 8–23)
CALCIUM SERPL-MCNC: 9.2 MG/DL (ref 8.8–10.4)
CHLORIDE SERPL-SCNC: 104 MMOL/L (ref 98–107)
CHOLEST SERPL-MCNC: 235 MG/DL
CREAT SERPL-MCNC: 0.94 MG/DL (ref 0.51–0.95)
EGFRCR SERPLBLD CKD-EPI 2021: 61 ML/MIN/1.73M2
EOSINOPHIL # BLD AUTO: 0.1 10E3/UL (ref 0–0.7)
EOSINOPHIL NFR BLD AUTO: 1 %
ERYTHROCYTE [DISTWIDTH] IN BLOOD BY AUTOMATED COUNT: 12.9 % (ref 10–15)
FASTING STATUS PATIENT QL REPORTED: NO
FASTING STATUS PATIENT QL REPORTED: NO
GLUCOSE SERPL-MCNC: 157 MG/DL (ref 70–99)
HCO3 SERPL-SCNC: 28 MMOL/L (ref 22–29)
HCT VFR BLD AUTO: 39.1 % (ref 35–47)
HDLC SERPL-MCNC: 59 MG/DL
HGB BLD-MCNC: 12.4 G/DL (ref 11.7–15.7)
IMM GRANULOCYTES # BLD: 0 10E3/UL
IMM GRANULOCYTES NFR BLD: 0 %
LDLC SERPL CALC-MCNC: 120 MG/DL
LYMPHOCYTES # BLD AUTO: 2.3 10E3/UL (ref 0.8–5.3)
LYMPHOCYTES NFR BLD AUTO: 21 %
MCH RBC QN AUTO: 27.7 PG (ref 26.5–33)
MCHC RBC AUTO-ENTMCNC: 31.7 G/DL (ref 31.5–36.5)
MCV RBC AUTO: 87 FL (ref 78–100)
MONOCYTES # BLD AUTO: 0.5 10E3/UL (ref 0–1.3)
MONOCYTES NFR BLD AUTO: 5 %
NEUTROPHILS # BLD AUTO: 7.8 10E3/UL (ref 1.6–8.3)
NEUTROPHILS NFR BLD AUTO: 73 %
NONHDLC SERPL-MCNC: 176 MG/DL
PLATELET # BLD AUTO: 324 10E3/UL (ref 150–450)
POTASSIUM SERPL-SCNC: 3.5 MMOL/L (ref 3.4–5.3)
RBC # BLD AUTO: 4.48 10E6/UL (ref 3.8–5.2)
SODIUM SERPL-SCNC: 144 MMOL/L (ref 135–145)
TRIGL SERPL-MCNC: 279 MG/DL
VIT B12 SERPL-MCNC: 207 PG/ML (ref 232–1245)
VIT D+METAB SERPL-MCNC: 45 NG/ML (ref 20–50)
WBC # BLD AUTO: 10.7 10E3/UL (ref 4–11)

## 2025-06-10 PROCEDURE — 3078F DIAST BP <80 MM HG: CPT | Performed by: NURSE PRACTITIONER

## 2025-06-10 PROCEDURE — 3075F SYST BP GE 130 - 139MM HG: CPT | Performed by: NURSE PRACTITIONER

## 2025-06-10 PROCEDURE — 85025 COMPLETE CBC W/AUTO DIFF WBC: CPT | Performed by: NURSE PRACTITIONER

## 2025-06-10 PROCEDURE — 84460 ALANINE AMINO (ALT) (SGPT): CPT | Performed by: NURSE PRACTITIONER

## 2025-06-10 PROCEDURE — 80061 LIPID PANEL: CPT | Performed by: NURSE PRACTITIONER

## 2025-06-10 PROCEDURE — 80048 BASIC METABOLIC PNL TOTAL CA: CPT | Performed by: NURSE PRACTITIONER

## 2025-06-10 PROCEDURE — 82607 VITAMIN B-12: CPT | Performed by: NURSE PRACTITIONER

## 2025-06-10 PROCEDURE — 71046 X-RAY EXAM CHEST 2 VIEWS: CPT | Mod: TC | Performed by: RADIOLOGY

## 2025-06-10 PROCEDURE — 83036 HEMOGLOBIN GLYCOSYLATED A1C: CPT | Performed by: NURSE PRACTITIONER

## 2025-06-10 PROCEDURE — 99214 OFFICE O/P EST MOD 30 MIN: CPT | Performed by: NURSE PRACTITIONER

## 2025-06-10 PROCEDURE — 1126F AMNT PAIN NOTED NONE PRSNT: CPT | Performed by: NURSE PRACTITIONER

## 2025-06-10 PROCEDURE — 82306 VITAMIN D 25 HYDROXY: CPT | Performed by: NURSE PRACTITIONER

## 2025-06-10 PROCEDURE — 36415 COLL VENOUS BLD VENIPUNCTURE: CPT | Performed by: NURSE PRACTITIONER

## 2025-06-10 RX ORDER — CLINDAMYCIN HYDROCHLORIDE 300 MG/1
300 CAPSULE ORAL 4 TIMES DAILY
Qty: 40 CAPSULE | Refills: 0 | Status: SHIPPED | OUTPATIENT
Start: 2025-06-10 | End: 2025-06-20

## 2025-06-10 ASSESSMENT — PAIN SCALES - GENERAL: PAINLEVEL_OUTOF10: NO PAIN (0)

## 2025-06-10 NOTE — PROGRESS NOTES
"    Stevenson Cabrales is a 80 year old, presenting for the following health issues:  Neck Problem (Right side of neck is painful x 1 wk, swelling around lower neck x 2 mo- pt notes abscess teeth on right side at well.)      6/10/2025     1:58 PM   Additional Questions   Roomed by Rosy Avila CMA     History of Present Illness       Reason for visit:  Swelling in neck    need antibiotic absess teeth   She is taking medications regularly.        Has abscessed teeth- scheduled for dental work at [facility] 7/2/25.  Neck has been swollen for months but not painful.  Afebrile.   Hypertension Follow-up    Do you check your blood pressure regularly outside of the clinic? No   Are you following a low salt diet? Yes  Are your blood pressures ever more than 140 on the top number (systolic) OR more   than 90 on the bottom number (diastolic), for example 140/90? Yes    BP Readings from Last 2 Encounters:   06/10/25 138/64   01/30/25 (!) 145/81       Review of Systems  Constitutional, neuro, ENT, endocrine, pulmonary, cardiac, gastrointestinal, genitourinary, musculoskeletal, integument and psychiatric systems are negative, except as otherwise noted.  Fatigue at times.  History of low vitamin D.       Objective    /64 (BP Location: Left arm, Patient Position: Sitting, Cuff Size: Adult Regular)   Pulse 79   Temp 98.6  F (37  C) (Oral)   Resp 16   Ht 1.549 m (5' 1\")   Wt 73.9 kg (162 lb 14.4 oz)   SpO2 92%   BMI 30.78 kg/m    Body mass index is 30.78 kg/m .  Physical Exam   GENERAL: alert and no distress  HENT: normal cephalic/atraumatic, ear canals and TM's normal, oral mucous membranes moist, and gums inflamed, abscessed right lower molar  NECK: mild swelling bilaterally   RESP: mild inspiratory wheezes bilateral  CV: regular rates and rhythm, normal S1 S2, no S3 or S4, and faint systolic murmur, no click or rub  MS: extremities normal- no gross deformities noted  NEURO: Normal strength and tone, " mentation intact and speech normal  PSYCH: mentation appears normal, affect normal/bright    Results reviewed in Epic      A/P:  1. Essential (primary) hypertension  Stable  - BASIC METABOLIC PANEL; Future  - CBC with platelets and differential; Future  - BASIC METABOLIC PANEL  - CBC with platelets and differential    2. Screening for cardiovascular condition    3. Mixed hyperlipidemia  - Lipid panel reflex to direct LDL Non-fasting; Future  - ALT; Future  - Lipid panel reflex to direct LDL Non-fasting  - ALT    4. Abscessed tooth (Primary)  Follow-up with dentist as scheduled  - CBC with platelets and differential; Future  - clindamycin (CLEOCIN) 300 MG capsule; Take 1 capsule (300 mg) by mouth 4 times daily for 10 days.  Dispense: 40 capsule; Refill: 0  - CBC with platelets and differential    5. Neck swelling  Discussed CT if unresolved after abscessed teeth removed  - CBC with platelets and differential; Future  - CBC with platelets and differential    6. Fatigue, unspecified type  - Vitamin B12; Future  - Vitamin B12    7. Chronic obstructive pulmonary disease, unspecified COPD type (H)  Stable  - XR Chest 2 Views; Future    8. Heart murmur  - XR Chest 2 Views; Future    9. Vitamin D deficiency, unspecified  - Vitamin D Deficiency; Future  - Vitamin D Deficiency    Signed Electronically by: Vonda Valadez, CNP

## 2025-06-11 ENCOUNTER — RESULTS FOLLOW-UP (OUTPATIENT)
Dept: FAMILY MEDICINE | Facility: CLINIC | Age: 80
End: 2025-06-11
Payer: COMMERCIAL

## 2025-06-11 DIAGNOSIS — R73.09 ELEVATED GLUCOSE: Primary | ICD-10-CM

## 2025-06-11 LAB
EST. AVERAGE GLUCOSE BLD GHB EST-MCNC: 123 MG/DL
HBA1C MFR BLD: 5.9 % (ref 0–5.6)

## 2025-06-11 NOTE — TELEPHONE ENCOUNTER
Information relayed to patient. She will call back to schedule appointment for B12 recheck due to many upcoming dental appointments.     Kaitlin Obregon RN

## 2025-06-11 NOTE — TELEPHONE ENCOUNTER
Results relayed to patient. She denies cholesterol medication at this time.     Also declines B12 injections at this time. Patient would be open to starting an OTC B12 supplement instead if that is advisable.     Routed to PCP for review.     Kaitlin Obregon RN

## 2025-06-17 ENCOUNTER — TELEPHONE (OUTPATIENT)
Dept: FAMILY MEDICINE | Facility: CLINIC | Age: 80
End: 2025-06-17
Payer: COMMERCIAL

## 2025-06-17 DIAGNOSIS — K04.7 ABSCESSED TOOTH: ICD-10-CM

## 2025-06-17 NOTE — TELEPHONE ENCOUNTER
Tonja Palmer RN called Samra on June 17, 2025 and left a message with PCP recommendations.        GLORY Lorenzo RN  [facility]

## 2025-06-17 NOTE — TELEPHONE ENCOUNTER
Medication Question or Refill    Contacts       Contact Date/Time Type Contact Phone/Fax    06/17/2025 10:39 AM CDT In Person (Incoming) Samra Gay (Self)             What medication are you calling about (include dose and sig)?: Antibiotic-recently prescribed    Preferred Pharmacy:   [facility] [id number] [place] [facility] [address]  [address] [address]  Phone: [phone] Fax: [phone]      Controlled Substance Agreement on file:   CSA -- Patient Level:    CSA: None found at the patient level.       Who prescribed the medication?: Vonda Valadez    Do you need a refill? Yes, There is a warning on the antibiotic NOT to take it with one of her prescribed mediacations-Abe (sp?)    When did you use the medication last? Patient is not taking antibiotic until she hears from you     Patient offered an appointment? No    Do you have any questions or concerns?  Yes: Is a different medication needed? Please call her to advise       Okay to leave a detailed message?: Yes on mobile -   [phone]

## 2025-08-12 ENCOUNTER — TELEPHONE (OUTPATIENT)
Dept: FAMILY MEDICINE | Facility: CLINIC | Age: 80
End: 2025-08-12
Payer: COMMERCIAL

## 2025-08-13 ENCOUNTER — NURSE TRIAGE (OUTPATIENT)
Dept: CARDIOLOGY | Facility: CLINIC | Age: 80
End: 2025-08-13
Payer: COMMERCIAL

## 2025-08-18 ENCOUNTER — ANCILLARY PROCEDURE (OUTPATIENT)
Dept: GENERAL RADIOLOGY | Facility: CLINIC | Age: 80
End: 2025-08-18
Attending: NURSE PRACTITIONER
Payer: COMMERCIAL

## 2025-08-18 ENCOUNTER — OFFICE VISIT (OUTPATIENT)
Dept: FAMILY MEDICINE | Facility: CLINIC | Age: 80
End: 2025-08-18
Payer: COMMERCIAL

## 2025-08-18 VITALS
DIASTOLIC BLOOD PRESSURE: 60 MMHG | OXYGEN SATURATION: 93 % | SYSTOLIC BLOOD PRESSURE: 114 MMHG | TEMPERATURE: 98.5 F | BODY MASS INDEX: 30.47 KG/M2 | HEIGHT: 61 IN | HEART RATE: 75 BPM | WEIGHT: 161.4 LBS | RESPIRATION RATE: 12 BRPM

## 2025-08-18 DIAGNOSIS — M25.511 ACUTE PAIN OF RIGHT SHOULDER: ICD-10-CM

## 2025-08-18 DIAGNOSIS — M25.511 ACUTE PAIN OF RIGHT SHOULDER: Primary | ICD-10-CM

## 2025-08-18 PROCEDURE — 1125F AMNT PAIN NOTED PAIN PRSNT: CPT | Performed by: NURSE PRACTITIONER

## 2025-08-18 PROCEDURE — 3074F SYST BP LT 130 MM HG: CPT | Performed by: NURSE PRACTITIONER

## 2025-08-18 PROCEDURE — 73030 X-RAY EXAM OF SHOULDER: CPT | Mod: TC | Performed by: RADIOLOGY

## 2025-08-18 PROCEDURE — 3078F DIAST BP <80 MM HG: CPT | Performed by: NURSE PRACTITIONER

## 2025-08-18 PROCEDURE — 99213 OFFICE O/P EST LOW 20 MIN: CPT | Performed by: NURSE PRACTITIONER

## 2025-08-18 ASSESSMENT — PAIN SCALES - GENERAL: PAINLEVEL_OUTOF10: SEVERE PAIN (9)

## 2025-08-19 ENCOUNTER — PATIENT OUTREACH (OUTPATIENT)
Dept: CARE COORDINATION | Facility: CLINIC | Age: 80
End: 2025-08-19
Payer: COMMERCIAL

## 2025-08-21 ENCOUNTER — PATIENT OUTREACH (OUTPATIENT)
Dept: CARE COORDINATION | Facility: CLINIC | Age: 80
End: 2025-08-21
Payer: COMMERCIAL

## 2025-08-25 ENCOUNTER — OFFICE VISIT (OUTPATIENT)
Dept: PULMONOLOGY | Facility: CLINIC | Age: 80
End: 2025-08-25
Payer: COMMERCIAL

## 2025-08-25 VITALS
OXYGEN SATURATION: 97 % | RESPIRATION RATE: 18 BRPM | HEART RATE: 80 BPM | DIASTOLIC BLOOD PRESSURE: 78 MMHG | WEIGHT: 161.8 LBS | SYSTOLIC BLOOD PRESSURE: 118 MMHG | BODY MASS INDEX: 30.57 KG/M2

## 2025-08-25 DIAGNOSIS — R06.09 DOE (DYSPNEA ON EXERTION): Primary | ICD-10-CM

## 2025-08-25 PROCEDURE — 3078F DIAST BP <80 MM HG: CPT | Performed by: INTERNAL MEDICINE

## 2025-08-25 PROCEDURE — 3074F SYST BP LT 130 MM HG: CPT | Performed by: INTERNAL MEDICINE

## 2025-08-25 PROCEDURE — G2211 COMPLEX E/M VISIT ADD ON: HCPCS | Performed by: INTERNAL MEDICINE

## 2025-08-25 PROCEDURE — 99212 OFFICE O/P EST SF 10 MIN: CPT | Performed by: INTERNAL MEDICINE

## 2025-08-25 RX ORDER — MAGNESIUM 200 MG
2 TABLET ORAL DAILY
COMMUNITY

## 2025-08-29 ENCOUNTER — TRANSFERRED RECORDS (OUTPATIENT)
Dept: HEALTH INFORMATION MANAGEMENT | Facility: CLINIC | Age: 80
End: 2025-08-29
Payer: COMMERCIAL